# Patient Record
Sex: FEMALE | Race: WHITE | NOT HISPANIC OR LATINO | Employment: UNEMPLOYED | URBAN - METROPOLITAN AREA
[De-identification: names, ages, dates, MRNs, and addresses within clinical notes are randomized per-mention and may not be internally consistent; named-entity substitution may affect disease eponyms.]

---

## 2017-02-13 ENCOUNTER — OFFICE VISIT - HEALTHEAST (OUTPATIENT)
Dept: INTERNAL MEDICINE | Facility: CLINIC | Age: 52
End: 2017-02-13

## 2017-02-13 DIAGNOSIS — E89.0 POSTOPERATIVE HYPOTHYROIDISM: ICD-10-CM

## 2017-02-13 DIAGNOSIS — Z13.9 SCREENING: ICD-10-CM

## 2017-02-13 DIAGNOSIS — R20.2 PARESTHESIAS: ICD-10-CM

## 2017-02-13 DIAGNOSIS — D64.9 ANEMIA: ICD-10-CM

## 2017-02-13 DIAGNOSIS — Z00.00 MEDICARE ANNUAL WELLNESS VISIT, INITIAL: ICD-10-CM

## 2017-02-13 DIAGNOSIS — Z12.31 ENCOUNTER FOR SCREENING MAMMOGRAM FOR MALIGNANT NEOPLASM OF BREAST: ICD-10-CM

## 2017-02-13 DIAGNOSIS — E66.9 OBESITY: ICD-10-CM

## 2017-02-13 DIAGNOSIS — E03.9 HYPOTHYROIDISM: ICD-10-CM

## 2017-02-13 DIAGNOSIS — Z12.39 BREAST CANCER SCREENING: ICD-10-CM

## 2017-02-13 DIAGNOSIS — R53.83 FATIGUE: ICD-10-CM

## 2017-02-13 LAB
CHOLEST SERPL-MCNC: 261 MG/DL
FASTING STATUS PATIENT QL REPORTED: YES
HDLC SERPL-MCNC: 80 MG/DL
LDLC SERPL CALC-MCNC: 161 MG/DL
TRIGL SERPL-MCNC: 102 MG/DL

## 2017-02-13 ASSESSMENT — MIFFLIN-ST. JEOR: SCORE: 1769.31

## 2017-02-14 ENCOUNTER — AMBULATORY - HEALTHEAST (OUTPATIENT)
Dept: INTERNAL MEDICINE | Facility: CLINIC | Age: 52
End: 2017-02-14

## 2017-02-14 ENCOUNTER — COMMUNICATION - HEALTHEAST (OUTPATIENT)
Dept: INTERNAL MEDICINE | Facility: CLINIC | Age: 52
End: 2017-02-14

## 2017-02-15 ENCOUNTER — COMMUNICATION - HEALTHEAST (OUTPATIENT)
Dept: INTERNAL MEDICINE | Facility: CLINIC | Age: 52
End: 2017-02-15

## 2017-02-15 ENCOUNTER — AMBULATORY - HEALTHEAST (OUTPATIENT)
Dept: INTERNAL MEDICINE | Facility: CLINIC | Age: 52
End: 2017-02-15

## 2017-02-15 DIAGNOSIS — M54.12 CERVICAL RADICULOPATHY: ICD-10-CM

## 2017-06-05 ENCOUNTER — OFFICE VISIT - HEALTHEAST (OUTPATIENT)
Dept: INTERNAL MEDICINE | Facility: CLINIC | Age: 52
End: 2017-06-05

## 2017-06-05 ENCOUNTER — AMBULATORY - HEALTHEAST (OUTPATIENT)
Dept: INTERNAL MEDICINE | Facility: CLINIC | Age: 52
End: 2017-06-05

## 2017-06-05 DIAGNOSIS — E03.9 PRIMARY HYPOTHYROIDISM: ICD-10-CM

## 2017-06-05 DIAGNOSIS — K52.9 GASTROENTERITIS: ICD-10-CM

## 2017-06-05 ASSESSMENT — MIFFLIN-ST. JEOR: SCORE: 1692.19

## 2017-08-06 ENCOUNTER — COMMUNICATION - HEALTHEAST (OUTPATIENT)
Dept: INTERNAL MEDICINE | Facility: CLINIC | Age: 52
End: 2017-08-06

## 2017-11-19 ENCOUNTER — COMMUNICATION - HEALTHEAST (OUTPATIENT)
Dept: INTERNAL MEDICINE | Facility: CLINIC | Age: 52
End: 2017-11-19

## 2018-02-05 ENCOUNTER — COMMUNICATION - HEALTHEAST (OUTPATIENT)
Dept: INTERNAL MEDICINE | Facility: CLINIC | Age: 53
End: 2018-02-05

## 2018-02-05 DIAGNOSIS — E03.9 HYPOTHYROID: ICD-10-CM

## 2018-02-11 ENCOUNTER — COMMUNICATION - HEALTHEAST (OUTPATIENT)
Dept: INTERNAL MEDICINE | Facility: CLINIC | Age: 53
End: 2018-02-11

## 2018-02-11 DIAGNOSIS — R12 HEARTBURN: ICD-10-CM

## 2018-05-01 ENCOUNTER — COMMUNICATION - HEALTHEAST (OUTPATIENT)
Dept: INTERNAL MEDICINE | Facility: CLINIC | Age: 53
End: 2018-05-01

## 2018-05-01 DIAGNOSIS — E03.9 HYPOTHYROID: ICD-10-CM

## 2018-08-06 ENCOUNTER — OFFICE VISIT - HEALTHEAST (OUTPATIENT)
Dept: INTERNAL MEDICINE | Facility: CLINIC | Age: 53
End: 2018-08-06

## 2018-08-06 DIAGNOSIS — Z98.84 HISTORY OF GASTRIC BYPASS: ICD-10-CM

## 2018-08-06 DIAGNOSIS — Z12.11 COLON CANCER SCREENING: ICD-10-CM

## 2018-08-06 DIAGNOSIS — Z12.31 VISIT FOR SCREENING MAMMOGRAM: ICD-10-CM

## 2018-08-06 DIAGNOSIS — E03.9 ACQUIRED HYPOTHYROIDISM: ICD-10-CM

## 2018-08-06 LAB
BASOPHILS # BLD AUTO: 0 THOU/UL (ref 0–0.2)
BASOPHILS NFR BLD AUTO: 0 % (ref 0–2)
EOSINOPHIL # BLD AUTO: 0.1 THOU/UL (ref 0–0.4)
EOSINOPHIL NFR BLD AUTO: 2 % (ref 0–6)
ERYTHROCYTE [DISTWIDTH] IN BLOOD BY AUTOMATED COUNT: 16.4 % (ref 11–14.5)
HCT VFR BLD AUTO: 31.7 % (ref 35–47)
HGB BLD-MCNC: 10.2 G/DL (ref 12–16)
LYMPHOCYTES # BLD AUTO: 1.5 THOU/UL (ref 0.8–4.4)
LYMPHOCYTES NFR BLD AUTO: 27 % (ref 20–40)
MCH RBC QN AUTO: 25.7 PG (ref 27–34)
MCHC RBC AUTO-ENTMCNC: 32.3 G/DL (ref 32–36)
MCV RBC AUTO: 80 FL (ref 80–100)
MONOCYTES # BLD AUTO: 0.3 THOU/UL (ref 0–0.9)
MONOCYTES NFR BLD AUTO: 6 % (ref 2–10)
NEUTROPHILS # BLD AUTO: 3.5 THOU/UL (ref 2–7.7)
NEUTROPHILS NFR BLD AUTO: 65 % (ref 50–70)
PLATELET # BLD AUTO: 212 THOU/UL (ref 140–440)
PMV BLD AUTO: 7.9 FL (ref 7–10)
RBC # BLD AUTO: 3.99 MILL/UL (ref 3.8–5.4)
WBC: 5.5 THOU/UL (ref 4–11)

## 2018-08-06 ASSESSMENT — MIFFLIN-ST. JEOR: SCORE: 1778.38

## 2018-08-10 LAB — TSI ACT/NOR SER: 106 %

## 2018-08-13 ENCOUNTER — COMMUNICATION - HEALTHEAST (OUTPATIENT)
Dept: INTERNAL MEDICINE | Facility: CLINIC | Age: 53
End: 2018-08-13

## 2018-08-13 DIAGNOSIS — E03.9 HYPOTHYROID: ICD-10-CM

## 2018-08-14 ENCOUNTER — COMMUNICATION - HEALTHEAST (OUTPATIENT)
Dept: INTERNAL MEDICINE | Facility: CLINIC | Age: 53
End: 2018-08-14

## 2018-08-20 ENCOUNTER — COMMUNICATION - HEALTHEAST (OUTPATIENT)
Dept: INTERNAL MEDICINE | Facility: CLINIC | Age: 53
End: 2018-08-20

## 2018-09-04 ENCOUNTER — COMMUNICATION - HEALTHEAST (OUTPATIENT)
Dept: INTERNAL MEDICINE | Facility: CLINIC | Age: 53
End: 2018-09-04

## 2018-10-02 ENCOUNTER — COMMUNICATION - HEALTHEAST (OUTPATIENT)
Dept: INTERNAL MEDICINE | Facility: CLINIC | Age: 53
End: 2018-10-02

## 2018-10-02 DIAGNOSIS — R12 HEARTBURN: ICD-10-CM

## 2018-11-20 ENCOUNTER — OFFICE VISIT - HEALTHEAST (OUTPATIENT)
Dept: INTERNAL MEDICINE | Facility: CLINIC | Age: 53
End: 2018-11-20

## 2018-11-20 DIAGNOSIS — Z23 NEED FOR VACCINATION: ICD-10-CM

## 2018-11-20 DIAGNOSIS — D50.9 IRON DEFICIENCY ANEMIA, UNSPECIFIED IRON DEFICIENCY ANEMIA TYPE: ICD-10-CM

## 2018-11-20 LAB
BASOPHILS # BLD AUTO: 0 THOU/UL (ref 0–0.2)
BASOPHILS NFR BLD AUTO: 0 % (ref 0–2)
EOSINOPHIL # BLD AUTO: 0.1 THOU/UL (ref 0–0.4)
EOSINOPHIL NFR BLD AUTO: 2 % (ref 0–6)
ERYTHROCYTE [DISTWIDTH] IN BLOOD BY AUTOMATED COUNT: 15.2 % (ref 11–14.5)
FERRITIN SERPL-MCNC: 6 NG/ML (ref 10–130)
HCT VFR BLD AUTO: 31.7 % (ref 35–47)
HGB BLD-MCNC: 10.2 G/DL (ref 12–16)
LYMPHOCYTES # BLD AUTO: 1.4 THOU/UL (ref 0.8–4.4)
LYMPHOCYTES NFR BLD AUTO: 29 % (ref 20–40)
MCH RBC QN AUTO: 25.8 PG (ref 27–34)
MCHC RBC AUTO-ENTMCNC: 32.1 G/DL (ref 32–36)
MCV RBC AUTO: 80 FL (ref 80–100)
MONOCYTES # BLD AUTO: 0.3 THOU/UL (ref 0–0.9)
MONOCYTES NFR BLD AUTO: 6 % (ref 2–10)
NEUTROPHILS # BLD AUTO: 2.9 THOU/UL (ref 2–7.7)
NEUTROPHILS NFR BLD AUTO: 63 % (ref 50–70)
PLATELET # BLD AUTO: 201 THOU/UL (ref 140–440)
PMV BLD AUTO: 7.8 FL (ref 7–10)
RBC # BLD AUTO: 3.94 MILL/UL (ref 3.8–5.4)
WBC: 4.7 THOU/UL (ref 4–11)

## 2018-11-21 ENCOUNTER — COMMUNICATION - HEALTHEAST (OUTPATIENT)
Dept: INTERNAL MEDICINE | Facility: CLINIC | Age: 53
End: 2018-11-21

## 2018-11-27 ENCOUNTER — COMMUNICATION - HEALTHEAST (OUTPATIENT)
Dept: INTERNAL MEDICINE | Facility: CLINIC | Age: 53
End: 2018-11-27

## 2018-11-27 DIAGNOSIS — E03.9 HYPOTHYROID: ICD-10-CM

## 2019-05-30 ENCOUNTER — COMMUNICATION - HEALTHEAST (OUTPATIENT)
Dept: INTERNAL MEDICINE | Facility: CLINIC | Age: 54
End: 2019-05-30

## 2019-05-30 DIAGNOSIS — E53.8 LOW SERUM VITAMIN B12: ICD-10-CM

## 2019-06-25 ENCOUNTER — COMMUNICATION - HEALTHEAST (OUTPATIENT)
Dept: INTERNAL MEDICINE | Facility: CLINIC | Age: 54
End: 2019-06-25

## 2019-10-03 ENCOUNTER — RECORDS - HEALTHEAST (OUTPATIENT)
Dept: ADMINISTRATIVE | Facility: OTHER | Age: 54
End: 2019-10-03

## 2019-10-05 ENCOUNTER — RECORDS - HEALTHEAST (OUTPATIENT)
Dept: ADMINISTRATIVE | Facility: OTHER | Age: 54
End: 2019-10-05

## 2019-10-09 ENCOUNTER — OFFICE VISIT - HEALTHEAST (OUTPATIENT)
Dept: INTERNAL MEDICINE | Facility: CLINIC | Age: 54
End: 2019-10-09

## 2019-10-09 DIAGNOSIS — Z12.31 VISIT FOR SCREENING MAMMOGRAM: ICD-10-CM

## 2019-10-09 DIAGNOSIS — E53.8 VITAMIN B12 DEFICIENCY (NON ANEMIC): ICD-10-CM

## 2019-10-09 DIAGNOSIS — E53.8 LOW SERUM VITAMIN B12: ICD-10-CM

## 2019-10-09 DIAGNOSIS — Z48.02 VISIT FOR SUTURE REMOVAL: ICD-10-CM

## 2019-10-09 ASSESSMENT — MIFFLIN-ST. JEOR: SCORE: 1769.31

## 2019-11-06 ENCOUNTER — COMMUNICATION - HEALTHEAST (OUTPATIENT)
Dept: INTERNAL MEDICINE | Facility: CLINIC | Age: 54
End: 2019-11-06

## 2019-11-06 DIAGNOSIS — R12 HEARTBURN: ICD-10-CM

## 2019-11-30 ENCOUNTER — COMMUNICATION - HEALTHEAST (OUTPATIENT)
Dept: INTERNAL MEDICINE | Facility: CLINIC | Age: 54
End: 2019-11-30

## 2019-11-30 DIAGNOSIS — E03.9 HYPOTHYROID: ICD-10-CM

## 2019-12-03 ENCOUNTER — COMMUNICATION - HEALTHEAST (OUTPATIENT)
Dept: INTERNAL MEDICINE | Facility: CLINIC | Age: 54
End: 2019-12-03

## 2019-12-03 DIAGNOSIS — E03.9 HYPOTHYROID: ICD-10-CM

## 2019-12-04 ENCOUNTER — OFFICE VISIT - HEALTHEAST (OUTPATIENT)
Dept: INTERNAL MEDICINE | Facility: CLINIC | Age: 54
End: 2019-12-04

## 2019-12-04 DIAGNOSIS — Z23 NEED FOR VACCINATION: ICD-10-CM

## 2019-12-04 DIAGNOSIS — E03.9 ACQUIRED HYPOTHYROIDISM: ICD-10-CM

## 2019-12-04 DIAGNOSIS — D50.8 IRON DEFICIENCY ANEMIA SECONDARY TO INADEQUATE DIETARY IRON INTAKE: ICD-10-CM

## 2019-12-04 LAB
ERYTHROCYTE [DISTWIDTH] IN BLOOD BY AUTOMATED COUNT: 16.7 % (ref 11–14.5)
FERRITIN SERPL-MCNC: 7 NG/ML (ref 10–130)
HCT VFR BLD AUTO: 30.9 % (ref 35–47)
HGB BLD-MCNC: 9.7 G/DL (ref 12–16)
MCH RBC QN AUTO: 23.8 PG (ref 27–34)
MCHC RBC AUTO-ENTMCNC: 31.3 G/DL (ref 32–36)
MCV RBC AUTO: 76 FL (ref 80–100)
PLATELET # BLD AUTO: 231 THOU/UL (ref 140–440)
PMV BLD AUTO: 7.9 FL (ref 7–10)
RBC # BLD AUTO: 4.07 MILL/UL (ref 3.8–5.4)
TSH SERPL DL<=0.005 MIU/L-ACNC: 3.83 UIU/ML (ref 0.3–5)
WBC: 6.9 THOU/UL (ref 4–11)

## 2019-12-04 ASSESSMENT — MIFFLIN-ST. JEOR: SCORE: 1769.31

## 2019-12-05 ENCOUNTER — COMMUNICATION - HEALTHEAST (OUTPATIENT)
Dept: INTERNAL MEDICINE | Facility: CLINIC | Age: 54
End: 2019-12-05

## 2020-02-29 ENCOUNTER — COMMUNICATION - HEALTHEAST (OUTPATIENT)
Dept: INTERNAL MEDICINE | Facility: CLINIC | Age: 55
End: 2020-02-29

## 2020-02-29 DIAGNOSIS — E03.9 HYPOTHYROID: ICD-10-CM

## 2020-10-26 ENCOUNTER — OFFICE VISIT - HEALTHEAST (OUTPATIENT)
Dept: INTERNAL MEDICINE | Facility: CLINIC | Age: 55
End: 2020-10-26

## 2020-10-26 DIAGNOSIS — Z98.84 HISTORY OF GASTRIC BYPASS: ICD-10-CM

## 2020-10-26 DIAGNOSIS — F19.10 SUBSTANCE ABUSE (H): ICD-10-CM

## 2020-10-26 DIAGNOSIS — F60.3 BORDERLINE PERSONALITY DISORDER (H): ICD-10-CM

## 2020-10-26 DIAGNOSIS — E53.8 LOW SERUM VITAMIN B12: ICD-10-CM

## 2020-10-26 DIAGNOSIS — E66.01 MORBID OBESITY (H): ICD-10-CM

## 2020-10-26 DIAGNOSIS — E03.9 ACQUIRED HYPOTHYROIDISM: ICD-10-CM

## 2020-10-26 DIAGNOSIS — Z86.2 HISTORY OF IRON DEFICIENCY ANEMIA: ICD-10-CM

## 2020-10-26 DIAGNOSIS — Z23 NEED FOR VACCINATION: ICD-10-CM

## 2020-10-26 DIAGNOSIS — Z86.59 HISTORY OF DEPRESSION: ICD-10-CM

## 2020-10-26 DIAGNOSIS — E53.8 VITAMIN B12 DEFICIENCY (NON ANEMIC): ICD-10-CM

## 2020-10-26 DIAGNOSIS — Z79.899 MEDICATION MANAGEMENT: ICD-10-CM

## 2020-10-26 LAB
ALBUMIN SERPL-MCNC: 4.2 G/DL (ref 3.5–5)
ALP SERPL-CCNC: 104 U/L (ref 45–120)
ALT SERPL W P-5'-P-CCNC: 59 U/L (ref 0–45)
ANION GAP SERPL CALCULATED.3IONS-SCNC: 8 MMOL/L (ref 5–18)
AST SERPL W P-5'-P-CCNC: 38 U/L (ref 0–40)
BILIRUB SERPL-MCNC: 0.4 MG/DL (ref 0–1)
BUN SERPL-MCNC: 15 MG/DL (ref 8–22)
CALCIUM SERPL-MCNC: 9.7 MG/DL (ref 8.5–10.5)
CHLORIDE BLD-SCNC: 109 MMOL/L (ref 98–107)
CO2 SERPL-SCNC: 22 MMOL/L (ref 22–31)
CREAT SERPL-MCNC: 0.97 MG/DL (ref 0.6–1.1)
ERYTHROCYTE [DISTWIDTH] IN BLOOD BY AUTOMATED COUNT: 17.2 % (ref 11–14.5)
GFR SERPL CREATININE-BSD FRML MDRD: 59 ML/MIN/1.73M2
GLUCOSE BLD-MCNC: 95 MG/DL (ref 70–125)
HCT VFR BLD AUTO: 33.7 % (ref 35–47)
HGB BLD-MCNC: 10.6 G/DL (ref 12–16)
MCH RBC QN AUTO: 24.8 PG (ref 27–34)
MCHC RBC AUTO-ENTMCNC: 31.3 G/DL (ref 32–36)
MCV RBC AUTO: 79 FL (ref 80–100)
PLATELET # BLD AUTO: 248 THOU/UL (ref 140–440)
PMV BLD AUTO: 8.3 FL (ref 7–10)
POTASSIUM BLD-SCNC: 4.4 MMOL/L (ref 3.5–5)
PROT SERPL-MCNC: 6.5 G/DL (ref 6–8)
RBC # BLD AUTO: 4.26 MILL/UL (ref 3.8–5.4)
SODIUM SERPL-SCNC: 139 MMOL/L (ref 136–145)
TSH SERPL DL<=0.005 MIU/L-ACNC: 3.37 UIU/ML (ref 0.3–5)
VIT B12 SERPL-MCNC: 570 PG/ML (ref 213–816)
WBC: 5.7 THOU/UL (ref 4–11)

## 2020-10-26 ASSESSMENT — MIFFLIN-ST. JEOR: SCORE: 1723.95

## 2020-10-27 ENCOUNTER — COMMUNICATION - HEALTHEAST (OUTPATIENT)
Dept: INTERNAL MEDICINE | Facility: CLINIC | Age: 55
End: 2020-10-27

## 2021-02-12 ENCOUNTER — COMMUNICATION - HEALTHEAST (OUTPATIENT)
Dept: ADMINISTRATIVE | Facility: CLINIC | Age: 56
End: 2021-02-12

## 2021-02-12 ENCOUNTER — COMMUNICATION - HEALTHEAST (OUTPATIENT)
Dept: INTERNAL MEDICINE | Facility: CLINIC | Age: 56
End: 2021-02-12

## 2021-02-12 DIAGNOSIS — R12 HEARTBURN: ICD-10-CM

## 2021-02-12 DIAGNOSIS — E03.9 HYPOTHYROID: ICD-10-CM

## 2021-02-24 ENCOUNTER — COMMUNICATION - HEALTHEAST (OUTPATIENT)
Dept: INTERNAL MEDICINE | Facility: CLINIC | Age: 56
End: 2021-02-24

## 2021-02-24 ENCOUNTER — OFFICE VISIT - HEALTHEAST (OUTPATIENT)
Dept: FAMILY MEDICINE | Facility: CLINIC | Age: 56
End: 2021-02-24

## 2021-02-24 DIAGNOSIS — E03.9 HYPOTHYROID: ICD-10-CM

## 2021-02-24 DIAGNOSIS — D64.9 ANEMIA, UNSPECIFIED TYPE: ICD-10-CM

## 2021-02-24 DIAGNOSIS — Z76.89 ENCOUNTER TO ESTABLISH CARE: ICD-10-CM

## 2021-02-24 DIAGNOSIS — Z98.84 HISTORY OF GASTRIC BYPASS: ICD-10-CM

## 2021-02-24 DIAGNOSIS — Z12.31 VISIT FOR SCREENING MAMMOGRAM: ICD-10-CM

## 2021-02-24 LAB
BASOPHILS # BLD AUTO: 0 THOU/UL (ref 0–0.2)
BASOPHILS NFR BLD AUTO: 1 % (ref 0–2)
EOSINOPHIL # BLD AUTO: 0 THOU/UL (ref 0–0.4)
EOSINOPHIL NFR BLD AUTO: 0 % (ref 0–6)
ERYTHROCYTE [DISTWIDTH] IN BLOOD BY AUTOMATED COUNT: 16.5 % (ref 11–14.5)
HCT VFR BLD AUTO: 33.1 % (ref 35–47)
HGB BLD-MCNC: 9.9 G/DL (ref 12–16)
IMM GRANULOCYTES # BLD: 0 THOU/UL
IMM GRANULOCYTES NFR BLD: 0 %
LYMPHOCYTES # BLD AUTO: 1.2 THOU/UL (ref 0.8–4.4)
LYMPHOCYTES NFR BLD AUTO: 23 % (ref 20–40)
MCH RBC QN AUTO: 24.3 PG (ref 27–34)
MCHC RBC AUTO-ENTMCNC: 29.9 G/DL (ref 32–36)
MCV RBC AUTO: 81 FL (ref 80–100)
MONOCYTES # BLD AUTO: 0.5 THOU/UL (ref 0–0.9)
MONOCYTES NFR BLD AUTO: 9 % (ref 2–10)
NEUTROPHILS # BLD AUTO: 3.4 THOU/UL (ref 2–7.7)
NEUTROPHILS NFR BLD AUTO: 67 % (ref 50–70)
PLATELET # BLD AUTO: 219 THOU/UL (ref 140–440)
PMV BLD AUTO: 10.4 FL (ref 7–10)
RBC # BLD AUTO: 4.08 MILL/UL (ref 3.8–5.4)
VIT B12 SERPL-MCNC: 670 PG/ML (ref 213–816)
WBC: 5.1 THOU/UL (ref 4–11)

## 2021-02-24 ASSESSMENT — MIFFLIN-ST. JEOR: SCORE: 1729.05

## 2021-02-26 ENCOUNTER — COMMUNICATION - HEALTHEAST (OUTPATIENT)
Dept: FAMILY MEDICINE | Facility: CLINIC | Age: 56
End: 2021-02-26

## 2021-05-03 ENCOUNTER — COMMUNICATION - HEALTHEAST (OUTPATIENT)
Dept: INTERNAL MEDICINE | Facility: CLINIC | Age: 56
End: 2021-05-03

## 2021-05-03 DIAGNOSIS — R12 HEARTBURN: ICD-10-CM

## 2021-05-03 DIAGNOSIS — E03.9 HYPOTHYROID: ICD-10-CM

## 2021-05-29 NOTE — TELEPHONE ENCOUNTER
"RN cannot approve Refill Request    RN can NOT refill this medication PCP messaged that patient is overdue for Labs.      Rosario Lindquist, [facility] Triage/Med Refill 5/31/2019    Requested Prescriptions   Pending Prescriptions Disp Refills     BD INTEGRA SYRINGE 3 mL 25 gauge x 1\" Syrg [Pharmacy Med Name: B-D #5270 SYR/NDL 3ML 25GX1 INTEGRA]  0     Sig: USE AS DIRECTED       There is no refill protocol information for this order        cyanocobalamin 1,000 mcg/mL injection [Pharmacy Med Name: CYANOCOBALAMIN 1000MCG/ML INJ, 1ML] 10 mL 0     Sig: INJECT 1 ML INTRAMUSCULARLY EVERY 30 DAYS       Cyanocobalamin (Vitamin B12)  Refill Protocol Failed - 5/30/2019  9:21 PM        Failed - Vitamin B12 level in last 12 months     Vitamin B-12   Date Value Ref Range Status   02/13/2017 562 213 - 816 pg/mL Final             Passed - PCP or prescribing provider visit in past 12 months       Last office visit with prescriber/PCP: 11/20/2018 Niles Jaramillo MD OR same dept: 11/20/2018 Niles Jaramillo MD OR same specialty: 11/20/2018 Niles Jaramillo MD Last physical: 2/13/2017 Last MTM visit: Visit date not found    Next visit within 3 mo: Visit date not found  Next physical within 3 mo: Visit date not found  Prescriber OR PCP: Niles Jaramillo MD  Last diagnosis associated with med order: There are no diagnoses linked to this encounter.             Passed - CBC in last 12 months     WBC   Date Value Ref Range Status   11/20/2018 4.7 4.0 - 11.0 thou/uL Final     RBC   Date Value Ref Range Status   11/20/2018 3.94 3.80 - 5.40 mill/uL Final     Hemoglobin   Date Value Ref Range Status   11/20/2018 10.2 (L) 12.0 - 16.0 g/dL Final     Hematocrit   Date Value Ref Range Status   11/20/2018 31.7 (L) 35.0 - 47.0 % Final     MCV   Date Value Ref Range Status   11/20/2018 80 80 - 100 fL Final     MCH   Date Value Ref Range Status   11/20/2018 25.8 (L) 27.0 - 34.0 pg Final     MCHC   Date Value Ref Range Status   11/20/2018 32.1 " 32.0 - 36.0 g/dL Final     RDW   Date Value Ref Range Status   11/20/2018 15.2 (H) 11.0 - 14.5 % Final     Platelets   Date Value Ref Range Status   11/20/2018 201 140 - 440 thou/uL Final     MPV   Date Value Ref Range Status   11/20/2018 7.8 7.0 - 10.0 fL Final

## 2021-05-30 VITALS — WEIGHT: 269 LBS | BODY MASS INDEX: 47.66 KG/M2 | HEIGHT: 63 IN

## 2021-05-31 ENCOUNTER — RECORDS - HEALTHEAST (OUTPATIENT)
Dept: ADMINISTRATIVE | Facility: CLINIC | Age: 56
End: 2021-05-31

## 2021-05-31 VITALS — WEIGHT: 252 LBS | BODY MASS INDEX: 44.65 KG/M2 | HEIGHT: 63 IN

## 2021-06-01 ENCOUNTER — RECORDS - HEALTHEAST (OUTPATIENT)
Dept: ADMINISTRATIVE | Facility: CLINIC | Age: 56
End: 2021-06-01

## 2021-06-01 VITALS — BODY MASS INDEX: 48.02 KG/M2 | WEIGHT: 271 LBS | HEIGHT: 63 IN

## 2021-06-02 VITALS — BODY MASS INDEX: 48.01 KG/M2 | HEIGHT: 63 IN

## 2021-06-02 NOTE — PROGRESS NOTES
"OFFICE VISIT NOTE    Subjective:   Chief Complaint:  Follow-up (ER, laceration on left forehead, had stitches placed that need to be removed. still having nausea, HA, and dizziness)    54-year-old woman who is in today for follow-up regarding head trauma.  About a week ago, shelf with a microwave collapsed and hit her arm forehead, left side.  She required emergency room visit and placement of sutures.  Since the injury she is experiencing some nausea and some mild dizziness.  Headaches as well.  No neurologic deficits.    Current Outpatient Medications   Medication Sig     BD INTEGRA SYRINGE 3 mL 25 gauge x 1\" Syrg USE AS DIRECTED     cyanocobalamin 1,000 mcg/mL injection INJECT 1 ML INTRAMUSCULARLY EVERY 30 DAYS     levothyroxine (SYNTHROID, LEVOTHROID) 175 MCG tablet TAKE 1 TABLET(175 MCG) BY MOUTH DAILY     meclizine (ANTIVERT) 25 mg tablet TK 1 T PO Q 6 H PRN FOR VERTIGO     omeprazole (PRILOSEC) 20 MG capsule TAKE 1 CAPSULE BY MOUTH TWICE DAILY     syringe with needle (SYRINGE 3CC/25GX1\") 3 mL 25 gauge x 1\" Syrg Use 1 each As Directed every 30 (thirty) days.     venlafaxine (EFFEXOR) 75 MG tablet Take 150 mg by mouth 2 (two) times a day.     cyanocobalamin 1,000 mcg/mL injection Inject 1 mL (1,000 mcg total) into the shoulder, thigh, or buttocks every 28 days.       Review of Systems:  A comprehensive review of systems is negative except for the comments above    Objective:    Pulse 77   Ht 5' 3\" (1.6 m)   Wt (!) 269 lb (122 kg)   SpO2 98%   BMI 47.65 kg/m    GENERAL: No acute distress.  Blood pressure is 136/84.  Pulse 68 and regular.  Eyes have full extraocular motions.  Half inch long laceration on the left side of the forehead.  Some surrounding ecchymosis.  No evidence of any cellulitis.  Speech and memory are intact.  Balance is normal.  Motor function is normal.  Eyes are normal without any papilledema or hemorrhaging.    Assessment & Plan   Makenna Black is a 54 y.o. female.    Head trauma with " laceration.  All sutures were removed without difficulty.  Post trauma headache with some dizziness and nausea.  PRN use of Tylenol.  I think this will slowly resolve.    Diagnoses and all orders for this visit:    Visit for screening mammogram  -     Mammo Screening Bilateral; Future; Expected date: 10/09/2019    Visit for suture removal    Vitamin B12 deficiency (non anemic)  -     cyanocobalamin 1,000 mcg/mL injection; Inject 1 mL (1,000 mcg total) into the shoulder, thigh, or buttocks every 28 days.  Dispense: 10 mL; Refill: 0    Low serum vitamin B12    Other orders  -     Influenza, Seasonal Quad, PF =/> 6months        Niles Jaramillo MD  Transcription using voice recognition software, may contain typographical errors.

## 2021-06-03 VITALS
WEIGHT: 269 LBS | DIASTOLIC BLOOD PRESSURE: 84 MMHG | OXYGEN SATURATION: 98 % | SYSTOLIC BLOOD PRESSURE: 136 MMHG | HEIGHT: 63 IN | BODY MASS INDEX: 47.66 KG/M2 | HEART RATE: 77 BPM

## 2021-06-03 NOTE — TELEPHONE ENCOUNTER
RN cannot approve Refill Request    RN can NOT refill this medication ---> PCP messaged that patient is overdue for Labs (TSH).  Last office visit:  10/9/2019     Luz Welch, [facility] Triage/Med Refill 12/3/2019    Requested Prescriptions   Pending Prescriptions Disp Refills     levothyroxine (SYNTHROID, LEVOTHROID) 175 MCG tablet [Pharmacy Med Name: LEVOTHYROXINE 0.175MG (175MCG) TABS] 90 tablet 0     Sig: TAKE 1 TABLET(175 MCG) BY MOUTH DAILY       Thyroid Hormones Protocol Failed - 12/3/2019 11:02 AM        Failed - TSH on file in past 12 months for patient age 12 & older     TSH   Date Value Ref Range Status   06/01/2017 23.79 (H) 0.30 - 5.00 uIU/mL Final                   Passed - Provider visit in past 12 months or next 3 months     Last office visit with prescriber/PCP: 10/9/2019 Niles Jaramillo MD OR same dept: 10/9/2019 Niles Jaramillo MD OR same specialty: 10/9/2019 Niles Jaramillo MD  Last physical: 2/13/2017 Last MTM visit: Visit date not found   Next visit within 3 mo: Visit date not found  Next physical within 3 mo: Visit date not found  Prescriber OR PCP: Niles Jaramillo MD  Last diagnosis associated with med order: 1. Hypothyroid  - levothyroxine (SYNTHROID, LEVOTHROID) 175 MCG tablet [Pharmacy Med Name: LEVOTHYROXINE 0.175MG (175MCG) TABS]; TAKE 1 TABLET(175 MCG) BY MOUTH DAILY  Dispense: 90 tablet; Refill: 0    If protocol passes may refill for 12 months if within 3 months of last provider visit (or a total of 15 months).

## 2021-06-03 NOTE — TELEPHONE ENCOUNTER
RN cannot approve Refill Request    RN can NOT refill this medication Protocol failed and NO refill given. Lab needed.      Ban Gilmore, [facility] Triage/Med Refill 11/30/2019    Requested Prescriptions   Pending Prescriptions Disp Refills     levothyroxine (SYNTHROID, LEVOTHROID) 175 MCG tablet [Pharmacy Med Name: LEVOTHYROXINE 0.175MG (175MCG) TABS] 90 tablet 0     Sig: TAKE 1 TABLET(175 MCG) BY MOUTH DAILY       Thyroid Hormones Protocol Failed - 11/30/2019 10:03 AM        Failed - TSH on file in past 12 months for patient age 12 & older     TSH   Date Value Ref Range Status   06/01/2017 23.79 (H) 0.30 - 5.00 uIU/mL Final                   Passed - Provider visit in past 12 months or next 3 months     Last office visit with prescriber/PCP: 10/9/2019 Niles Jaramillo MD OR same dept: 10/9/2019 Niles Jaramillo MD OR same specialty: 10/9/2019 Niles Jaramillo MD  Last physical: 2/13/2017 Last MTM visit: Visit date not found   Next visit within 3 mo: Visit date not found  Next physical within 3 mo: Visit date not found  Prescriber OR PCP: Niles Jaramillo MD  Last diagnosis associated with med order: 1. Hypothyroid  - levothyroxine (SYNTHROID, LEVOTHROID) 175 MCG tablet [Pharmacy Med Name: LEVOTHYROXINE 0.175MG (175MCG) TABS]; TAKE 1 TABLET(175 MCG) BY MOUTH DAILY  Dispense: 90 tablet; Refill: 0    If protocol passes may refill for 12 months if within 3 months of last provider visit (or a total of 15 months).

## 2021-06-03 NOTE — PROGRESS NOTES
"OFFICE VISIT NOTE    Subjective:   Chief Complaint:  Follow-up    54-year-old woman in for follow-up regarding history of hypothyroidism.  Also past history of gastric bypass and developed iron deficiency anemia because of poor iron absorption.  She is doing okay.  Some fatigue.  Needs her thyroid checked.    Current Outpatient Medications   Medication Sig     BD INTEGRA SYRINGE 3 mL 25 gauge x 1\" Syrg USE AS DIRECTED     cyanocobalamin 1,000 mcg/mL injection Inject 1 mL (1,000 mcg total) into the shoulder, thigh, or buttocks every 28 days.     levothyroxine (SYNTHROID, LEVOTHROID) 175 MCG tablet TAKE 1 TABLET(175 MCG) BY MOUTH DAILY     meclizine (ANTIVERT) 25 mg tablet TK 1 T PO Q 6 H PRN FOR VERTIGO     omeprazole (PRILOSEC) 20 MG capsule TAKE 1 CAPSULE BY MOUTH TWICE DAILY     syringe with needle (SYRINGE 3CC/25GX1\") 3 mL 25 gauge x 1\" Syrg Use 1 each As Directed every 30 (thirty) days.     venlafaxine (EFFEXOR) 75 MG tablet Take 150 mg by mouth 2 (two) times a day.       Review of Systems:  A comprehensive review of systems is negative except for the comments above    Objective:    There were no vitals taken for this visit.  GENERAL: No acute distress.  She is significantly obese yet.  Blood pressures 136/84.  Pulse 76.  Thyroid is been surgically removed.  Reflexes are normal.  Heart shows a sinus rhythm without murmur gallop or rub.  Lungs do sound clear.  She appears a little pale but not profoundly so.  Some osteoarthritic changes of her joints.  Assessment & Plan   Makenna Black is a 54 y.o. female.    Check TSH.  Adjust thyroid as necessary.  Recheck her hemoglobin as she has had iron deficiency in the past.  In fact, she required iron infusions several years ago.  She also needs a tetanus diphtheria shot.  Diagnoses and all orders for this visit:    Acquired hypothyroidism  -     Thyroid Cascade    Iron deficiency anemia secondary to inadequate dietary iron intake  -     HM2(CBC w/o Differential)    Need " for vaccination  -     Td, Adult, Adsorbed (blue label)        Niles Jaramillo MD  Transcription using voice recognition software, may contain typographical errors.

## 2021-06-03 NOTE — TELEPHONE ENCOUNTER
Refill Approved    Rx renewed per Medication Renewal Policy. Medication was last renewed on 10.9.19.    Lalita Foley, [facility] Triage/Med Refill 11/7/2019     Requested Prescriptions   Pending Prescriptions Disp Refills     omeprazole (PRILOSEC) 20 MG capsule [Pharmacy Med Name: OMEPRAZOLE 20MG CAPSULES] 180 capsule 0     Sig: TAKE 1 CAPSULE BY MOUTH TWICE DAILY       GI Medications Refill Protocol Passed - 11/6/2019  6:36 PM        Passed - PCP or prescribing provider visit in last 12 or next 3 months.     Last office visit with prescriber/PCP: 10/9/2019 Niles Jaramillo MD OR same dept: 10/9/2019 Niles Jaramillo MD OR same specialty: 10/9/2019 Niles Jaramillo MD  Last physical: 2/13/2017 Last MTM visit: Visit date not found   Next visit within 3 mo: Visit date not found  Next physical within 3 mo: Visit date not found  Prescriber OR PCP: Niles Jaramillo MD  Last diagnosis associated with med order: 1. Heartburn  - omeprazole (PRILOSEC) 20 MG capsule [Pharmacy Med Name: OMEPRAZOLE 20MG CAPSULES]; TAKE 1 CAPSULE BY MOUTH TWICE DAILY  Dispense: 180 capsule; Refill: 0    If protocol passes may refill for 12 months if within 3 months of last provider visit (or a total of 15 months).

## 2021-06-03 NOTE — TELEPHONE ENCOUNTER
FYI - Status Update  Who is Calling: Patient  Update: Patient's refill was denied on 11/30/19. Patient stated she is out now. Please send this in as soon as possible. Patient was transferred to scheduling to make a lab appointment.   Okay to leave a detailed message?:  No

## 2021-06-04 VITALS
BODY MASS INDEX: 47.66 KG/M2 | DIASTOLIC BLOOD PRESSURE: 84 MMHG | SYSTOLIC BLOOD PRESSURE: 136 MMHG | HEIGHT: 63 IN | HEART RATE: 76 BPM | WEIGHT: 269 LBS

## 2021-06-05 VITALS
HEART RATE: 82 BPM | TEMPERATURE: 97.9 F | DIASTOLIC BLOOD PRESSURE: 88 MMHG | BODY MASS INDEX: 46.25 KG/M2 | SYSTOLIC BLOOD PRESSURE: 130 MMHG | WEIGHT: 261 LBS | HEIGHT: 63 IN | OXYGEN SATURATION: 97 %

## 2021-06-05 VITALS
SYSTOLIC BLOOD PRESSURE: 138 MMHG | WEIGHT: 259 LBS | OXYGEN SATURATION: 99 % | HEART RATE: 79 BPM | BODY MASS INDEX: 45.89 KG/M2 | HEIGHT: 63 IN | DIASTOLIC BLOOD PRESSURE: 84 MMHG

## 2021-06-06 NOTE — TELEPHONE ENCOUNTER
Refill Approved    Rx renewed per Medication Renewal Policy. Medication was last renewed on 12/13/2019.    Marcial Salcido, [facility] Triage/Med Refill 3/1/2020     Requested Prescriptions   Pending Prescriptions Disp Refills     levothyroxine (SYNTHROID, LEVOTHROID) 175 MCG tablet [Pharmacy Med Name: LEVOTHYROXINE 0.175MG (175MCG) TABS] 90 tablet 0     Sig: TAKE 1 TABLET(175 MCG) BY MOUTH DAILY       Thyroid Hormones Protocol Passed - 2/29/2020 12:39 PM        Passed - Provider visit in past 12 months or next 3 months     Last office visit with prescriber/PCP: 12/4/2019 Niles Jaramillo MD OR same dept: 12/4/2019 Niles Jaramillo MD OR same specialty: 12/4/2019 Niles Jaramillo MD  Last physical: 2/13/2017 Last MTM visit: Visit date not found   Next visit within 3 mo: Visit date not found  Next physical within 3 mo: Visit date not found  Prescriber OR PCP: Niles Jaramillo MD  Last diagnosis associated with med order: 1. Hypothyroid  - levothyroxine (SYNTHROID, LEVOTHROID) 175 MCG tablet [Pharmacy Med Name: LEVOTHYROXINE 0.175MG (175MCG) TABS]; TAKE 1 TABLET(175 MCG) BY MOUTH DAILY  Dispense: 90 tablet; Refill: 0    If protocol passes may refill for 12 months if within 3 months of last provider visit (or a total of 15 months).             Passed - TSH on file in past 12 months for patient age 12 & older     TSH   Date Value Ref Range Status   12/04/2019 3.83 0.30 - 5.00 uIU/mL Final

## 2021-06-08 NOTE — PROGRESS NOTES
Medicare Annual Preventive Physical Exam (Wellness Assessment)   Makenna Black   52 y.o.  female in for annual wellness exam.  She has a history of depression, obesity, postoperative hypothyroidism.  Physical she had thyroidectomy because of large nodules.  One nodule was found to be cancerous was a small 1.  She has been on thyroid replacement since.    Complains of fatigability.  Also has burning paresthesias in her hands at night.  No weakness.      Date of visit: 2/13/2017  Physician: Niles Jaramillo MD     Assessment and Plan   1. Screening  She is due for a mammogram    2. Medicare annual wellness visit, initial      3. Paresthesias  This could be carpal tunnel.  We'll also check B12 and TSH and hemoglobin.  Blood tests are normal, consider EMG    4. Postoperative hypothyroidism  Check a TSH.    5. Obesity  She's had bariatric surgery about 13 years ago was gained all her weight back.  - Lipid Cascade    6. Fatigue  Much more prominent in the past 3 months.  She wonders if she is iron deficient or B12 deficient.  - HM1(CBC and Differential)  - Comprehensive Metabolic Panel  - Urinalysis-UC if Indicated  - HM1 (CBC with Diff)    7. Breast cancer screening   mammogram    8. Anemia    - Vitamin B12  - Ferritin    9. Hypothyroidism  She takes her thyroid daily.  - Thyroid Stimulating Hormone (TSH)      Advanced Care Planning discussed today: yes  Five Wishes Completed and Scanned into EMR: No    No Follow-up on file.     Chief Complaint   Chief Complaint   Patient presents with     Medicare Annual Wellness Visit Initial     Pt is fasting        Patient Profile   Social History     Social History Narrative    Single, no children 12/15        Past Medical History   There is no problem list on file for this patient.      Past Surgical History  She has a past surgical history that includes Cholecystectomy; Appendectomy; Hysterectomy; Salpingoophorectomy; Knee arthroscopy; Sinus surgery; Tonsillectomy; Gastroplasty  vertical banded (2003); Total knee arthroplasty (Bilateral, 2006/2007); Gastric bypass (2007); and Spine surgery.     History of Present Illness   This 52 y.o. old female seen for physical exam.  She complains of fatigability.  Also some burning paresthesias of her hands at night.  No chest pain no shortness of breath no fevers or chills.  She continues to gain weight.  She has a long history of obesity.  No chest pain or shortness of breath  No asthma or chronic cough or wheezing  No dysuria or hematuria .  No seizures.  No TIAs.  No tremor.  10 years post bilateral knee replacement doing well.  Other joints seem okay.    Review of Systems: A comprehensive review of systems was negative except as noted.     Risk Assessment   Depression: PHQ-9 reviewed; score is 0    Cognitive Impairment:  Mini-Cog reviewed, number of words correctly recalled + clock score: 3 work recall suggesting no dementia    Falls Risk Assessment:  Please see the nursing progress note for questionnaire response  Hearing Assessment:   Please see the nursing progress note for questionnaire response  Home Safety Review:  Please see the nursing progress note for questionnaire response  Diet and exercise reviewed: yes     Medications, Allergies and Immunizations    Current Outpatient Prescriptions   Medication Sig Dispense Refill     levothyroxine (SYNTHROID, LEVOTHROID) 125 MCG tablet TAKE 1 TABLET BY MOUTH DAILY 90 tablet 0     omeprazole (PRILOSEC) 20 MG capsule TAKE 1 CAPSULE BY MOUTH TWICE DAILY 180 capsule 0     venlafaxine (EFFEXOR) 75 MG tablet Take 150 mg by mouth 2 (two) times a day.       No current facility-administered medications for this visit.      Allergies   Allergen Reactions     Penicillins      Immunization History   Administered Date(s) Administered     Tdap 10/23/2008        Family and Social History   Family History   Problem Relation Age of Onset     Hypertension Mother      A&W     ASHLEY disease Mother      Diabetes Father   "    A&W        Social History   Substance Use Topics     Smoking status: Never Smoker     Smokeless tobacco: None     Alcohol use None        Physical Exam   General Appearance:   Obese woman in no distress.  She is a little pale.  Blood pressure initially 1 2400 later 1 4292.    Visit Vitals     Ht 5' 3\" (1.6 m)     Wt (!) 269 lb (122 kg)     BMI 47.65 kg/m2    Body mass index is 47.65 kg/(m^2).    EYES: Eyelids, conjunctiva, and sclera were normal. Pupils were normal. Cornea, iris, and lens were normal bilaterally.  Wears glasses   HEAD, EARS, NOSE, MOUTH, AND THROAT: Head and face were normal. Hearing was normal to voice and the ears were normal to external exam. Nose appearance was normal and there was no discharge. Oropharynx was normal.  NECK: Neck appearance was normal. There were no neck masses and the thyroid was not enlarged.  No bruits  RESPIRATORY: Breathing pattern was normal and the chest moved symmetrically.  Percussion/auscultatory percussion was normal.  Lung sounds were normal and there were no abnormal sounds.  CARDIOVASCULAR: Heart rate and rhythm were normal.  S1 and S2 were normal and there were no extra sounds or murmurs. Peripheral pulses in arms and legs were normal.  Jugular venous pressure was normal.  There was no peripheral edema.  GASTROINTESTINAL: The abdomen was obese in contour.  Bowel sounds were present.  Percussion detected no organ enlargement or tenderness.  Palpation detected no tenderness, mass, or enlarged organs.   MUSCULOSKELETAL: Skeletal configuration was normal and muscle mass was normal for age. Joint appearance was overall normal.  Bilateral total knees in the past  LYMPHATIC: There were no enlarged nodes.  SKIN/HAIR/NAILS: Skin color was normal.  There were no skin lesions.  Hair and nails were normal.  NEUROLOGIC: The patient was alert and oriented to person, place, time, and circumstance. Speech was normal. Cranial nerves were normal. Motor strength was normal for " age. The patient was normally coordinated.  Get up and go test less than 10 seconds.  Clock drawing is normal.  PSYCHIATRIC:  Mood and affect were normal and the patient had normal recent and remote memory. The patient's judgment and insight were normal.    ADDITIONAL VITAL SIGNS: Oxygen saturation 97%  CHEST WALL/BREASTS: No masses.  Mammogram ordered.    RECTAL: Not checked   GENITAL/URINARY: Not checked    Functional status: direct observation reveals these concerns: none  Safety status: direct observation reveals these concerns: none     Additional Information   Counseling and education provided today includes proper nutrition and body habitus, fall prevention, and for those items ordered above. Plan for future preventive services in Patient Instructions, printed on After Visit Summary and given to patient.    In addition to the time spent completing the annual wellness/health maintenance assessments, a total of 40  minutes time of which more than 50% of that time was spent counseling patient on fatigue, weight loss, causes and tingling of the hands and/or coordinating care for pt.  Patient questions answered.  Patient may need an EMG at a later date      Niles Jaramillo MD  Internal Medicine  Contact me at [phone]

## 2021-06-11 NOTE — PROGRESS NOTES
"OFFICE VISIT NOTE    Subjective:   Chief Complaint:  Follow-up (ER f/u 6/1/17 had diarrhea and nausea with HA. didn't like her physician at the ER and left before they d/c'd her. she never started flagyl and her TSH was 23.79 at that visit)    52-year-old with multiple problems was recently in the emergency room with complaint of having some fever and diarrhea.  Symptoms started one day after she was at a graduation.  She does not remember eating anything such as poorly cooked beef etc.  No one else to her knowledge is sick.  Continues to have loose stools.  Minor cramping but definite nausea.  Some headache.    Current Outpatient Prescriptions   Medication Sig     levothyroxine (SYNTHROID) 175 MCG tablet Take 1 tablet (175 mcg total) by mouth daily.     omeprazole (PRILOSEC) 20 MG capsule TAKE 1 CAPSULE BY MOUTH TWICE DAILY     venlafaxine (EFFEXOR) 75 MG tablet Take 150 mg by mouth 2 (two) times a day.     ciprofloxacin HCl (CIPRO) 500 MG tablet Take 1 tablet (500 mg total) by mouth 2 (two) times a day for 7 days.     ondansetron (ZOFRAN, AS HYDROCHLORIDE,) 4 MG tablet Take 1 tablet (4 mg total) by mouth every 8 (eight) hours as needed for nausea.       Review of Systems:  A comprehensive review of systems is negative except for the comments above    Objective:    Pulse 80  Ht 5' 3\" (1.6 m)  Wt (!) 252 lb (114.3 kg)  SpO2 98%  BMI 44.64 kg/m2  GENERAL: No acute distress.  Temperature is 99.  Blood pressure is normal.  Pulse 80.  There is no jaundice.  No scleral icterus.  Mouth and throat look normal.  The abdomen is obese but really with normal bowel sounds.  There is no tenderness or guarding.  No organomegaly or masses.  Flank tenderness.  Heart does show regular rhythm without ectopic beats.    Assessment & Plan   Makenna Black is a 52 y.o. female.    Symptoms sound like gastroenteritis.  She did have a fever with this.  She was supposed to take an antibiotic from the emergency room the left before she got " it filled.  I would put her on Cipro 500 twice daily.  As needed use of Zofran for nausea.  I note that her TSH is still 23.  It was over 102 months ago.  She has been taking thyroid daily.  I told her I would like her to stay on the same dosage of thyroid and repeat the TSH in 3 weeks.  I will adjust thyroid dosage as needed.    Diagnoses and all orders for this visit:    Gastroenteritis  -     ciprofloxacin HCl (CIPRO) 500 MG tablet; Take 1 tablet (500 mg total) by mouth 2 (two) times a day for 7 days.  Dispense: 14 tablet; Refill: 0  -     ondansetron (ZOFRAN, AS HYDROCHLORIDE,) 4 MG tablet; Take 1 tablet (4 mg total) by mouth every 8 (eight) hours as needed for nausea.  Dispense: 10 tablet; Refill: 1    Primary hypothyroidism        Niles Jaramillo MD  Transcription using voice recognition software, may contain typographical errors.

## 2021-06-12 NOTE — PROGRESS NOTES
"OFFICE VISIT NOTE    Subjective:   Chief Complaint:  Follow-up (medication review)    55-year-old woman in for follow-up regarding several problems including obesity status post gastric bypass, iron deficiency anemia, osteoarthritis, polysubstance abuse now maintaining sobriety in remission.  Also hypothyroid.  She seems to be doing well.  She continues off all drugs and alcohol.  Mood is somewhat better.  Denies increasing dyspnea although she does want to get her hemoglobin checked.  No fevers cough or any recent infections.    Current Outpatient Medications   Medication Sig     BD INTEGRA SYRINGE 3 mL 25 gauge x 1\" Syrg USE AS DIRECTED     cyanocobalamin 1,000 mcg/mL injection Inject 1 mL (1,000 mcg total) into the shoulder, thigh, or buttocks every 28 days.     levothyroxine (SYNTHROID, LEVOTHROID) 175 MCG tablet TAKE 1 TABLET(175 MCG) BY MOUTH DAILY     omeprazole (PRILOSEC) 20 MG capsule TAKE 1 CAPSULE BY MOUTH TWICE DAILY     syringe with needle (SYRINGE 3CC/25GX1\") 3 mL 25 gauge x 1\" Syrg Use 1 each As Directed every 30 (thirty) days.     venlafaxine (EFFEXOR) 75 MG tablet Take 150 mg by mouth 2 (two) times a day.     meclizine (ANTIVERT) 25 mg tablet TK 1 T PO Q 6 H PRN FOR VERTIGO       PSFHx: Tobacco Status:  She  reports that she has never smoked. She has never used smokeless tobacco.    Review of Systems:  A comprehensive review of systems is negative except for the comments above    Objective:    Ht 5' 3\" (1.6 m)   Wt (!) 259 lb (117.5 kg)   BMI 45.88 kg/m    GENERAL: No acute distress.  Weight is 10 pounds less than previously.  She still obese.  Pressure 142/84.  Pulse 79.  Oxygen saturation 99%.  No edema  Lungs are clear  Heart shows a regular rhythm without murmur gallop or rub  Thyroid does not seem enlarged.  Neurologic exam seems normal for age.  Significant obesity yet.    Assessment & Plan   Makenna Black is a 55 y.o. female.    She feels stable.  She would like her bloods checked.  Her " substance abuse seems to be under good control at this time.  Diagnoses and all orders for this visit:    Acquired hypothyroidism  -     Thyroid Cascade    Morbid obesity (H)  Continue to advise weight loss.  History of gastric bypass  -     Vitamin B12    History of iron deficiency anemia  -     HM2(CBC w/o Differential)  She has required iron infusions in the past but none in the last 2 years.  History of depression  Continues on venlafaxine.  Need for vaccination  -     Influenza, Recombinant, Inj, Quadrivalent, PF, 18+YRS    Medication management  -     Comprehensive Metabolic Panel    She is due for a mammogram.    The following high BMI interventions were performed this visit: encouragement to exercise    Niles Jaramillo MD  Transcription using voice recognition software, may contain typographical errors.

## 2021-06-15 NOTE — TELEPHONE ENCOUNTER
Pt is calling for refill     Levothyroxine  Omeprazole    [facility] on [facility] - Pt has two days remaining.    [facility] has called pt today and stated advised that they have not heard a response back.    Pt was last seen on 10/26/2020    Pt wants to make sure the [facility] location is used.    Offered to schedule Establish Care appt - pt declined.

## 2021-06-15 NOTE — TELEPHONE ENCOUNTER
1st attempt to contact patient    Left message to call back for: Makenna     Information to relay to patient:  LMTCB    Please advise as below     Sent Cash[employer] message as well     ----- Message from Jesse Gomez MD sent at 2/24/2021  4:44 PM CST -----  pls call:   Low hgb of 9.9, mildly decreased from 4 months ago.   Recommending daily Iron supplementation if not taking already.   Daily supplementation can be sent as a prescription if desired.

## 2021-06-15 NOTE — TELEPHONE ENCOUNTER
Last Office Visit   10/26/20   Notes:    Subjective:   Chief Complaint:  Follow-up (medication review)     55-year-old woman in for follow-up regarding several problems including obesity status post gastric bypass, iron deficiency anemia, osteoarthritis, polysubstance abuse now maintaining sobriety in remission.  Also hypothyroid.  She seems to be doing well.  She continues off all drugs and alcohol.  Mood is somewhat better.  Denies increasing dyspnea although she does want to get her hemoglobin checked.  No fevers cough or any recent infections.       Last Filled:  03/01/2020 Levothyroxine          11/07/19 omeprazole           Next OV:  Visit date not found    LETTER MAILED AND VIA MYCHART-pt needs to be seen to [facility] with New PCP      Medication teed up for provider signature

## 2021-06-15 NOTE — PATIENT INSTRUCTIONS - HE
1. Visit for screening mammogram  - Mammo Screening Bilateral    2. Hypothyroid    3. Heartburn    4. History of gastric bypass  - Vitamin B12    5. Anemia, unspecified type  - HM1(CBC and Differential)    6. Encounter to establish care

## 2021-06-15 NOTE — TELEPHONE ENCOUNTER
Former patient of Clint & has not established care with another provider.  Please assign refill request to covering provider per Clinic standard process.      RN cannot approve Refill Request    RN can NOT refill this medication no pcp. Last office visit: Visit date not found Last Physical: Visit date not found Last MTM visit: Visit date not found Last visit same specialty: 10/26/2020 Niles Jaramillo MD.  Next visit within 3 mo: Visit date not found  Next physical within 3 mo: Visit date not found      Ronni Centeno, [facility] Connection Triage/Med Refill 2/14/2021    Requested Prescriptions   Pending Prescriptions Disp Refills     omeprazole (PRILOSEC) 20 MG capsule [Pharmacy Med Name: OMEPRAZOLE 20MG CAPSULES] 180 capsule 0     Sig: TAKE 1 CAPSULE BY MOUTH TWICE DAILY       GI Medications Refill Protocol Passed - 2/12/2021  2:46 PM        Passed - PCP or prescribing provider visit in last 12 or next 3 months.     Last office visit with prescriber/PCP: Visit date not found OR same dept: Visit date not found OR same specialty: 10/26/2020 Niles Jaramillo MD  Last physical: Visit date not found Last MTM visit: Visit date not found   Next visit within 3 mo: Visit date not found  Next physical within 3 mo: Visit date not found  Prescriber OR PCP: Lex Valerio MD  Last diagnosis associated with med order: 1. Heartburn  - omeprazole (PRILOSEC) 20 MG capsule [Pharmacy Med Name: OMEPRAZOLE 20MG CAPSULES]; TAKE 1 CAPSULE BY MOUTH TWICE DAILY  Dispense: 180 capsule; Refill: 0    If protocol passes may refill for 12 months if within 3 months of last provider visit (or a total of 15 months).

## 2021-06-15 NOTE — TELEPHONE ENCOUNTER
Please let patient know that I sent refill for both. At next establish care visit with Dr. VILLARREAL, they can give more refills.    Dr. Obrien

## 2021-06-15 NOTE — TELEPHONE ENCOUNTER
Called and lvm for patient to call back for message from provider. Also stated a message was sent via imedo that she can read and respond to as well.

## 2021-06-15 NOTE — PROGRESS NOTES
"    Assessment & Plan     Encounter to establish care    Hypothyroid  Lab Results   Component Value Date    TSH 3.37 10/26/2020    Continue current management     Anemia, unspecified type  Recheck   - HM1(CBC and Differential)  Low hgb   Recommending daily Iron supplementation     History of gastric bypass  Recheck   - Vitamin B12  Normal     Visit for screening mammogram  - Mammo Screening Bilateral      30  minutes spent on the date of the encounter doing chart review, review of test results, interpretation of tests, patient visit and documentation        BMI:   Estimated body mass index is 46.6 kg/m  as calculated from the following:    Height as of this encounter: 5' 2.75\" (1.594 m).    Weight as of this encounter: 261 lb (118.4 kg).       No follow-ups on file.    Jesse Gomez MD  [facility]   Makenna Black is 56 y.o. and presents today for healthcare establishment   She just recently had a 3 months refill of her meds, and would like to have her mental health meds managed in primary since been stable for a while and previously managed by her psychiatry.   She is requesting a recheck of her hgb with a history of anemia.   No other concerns.         Review of Systems  A complete 5 point review of systems was obtained and is negative other than what is stated in the HPI.        Objective    /88   Pulse 82   Temp 97.9  F (36.6  C)   Ht 5' 2.75\" (1.594 m)   Wt (!) 261 lb (118.4 kg)   SpO2 97%   BMI 46.60 kg/m    Body mass index is 46.6 kg/m .  Physical Exam  General Appearance:    Alert, well hydrated, no distress   Neck:   Supple, symmetrical, trachea midline, no adenopathy;     thyroid:  no enlargement/tenderness/nodules;    Lungs:     clear to auscultation, no wheezes, rales or rhonchi, symmetric air entry     Heart:    Regular rate and rhythm, S1 and S2 normal, no murmur, rub   or gallop, no edema    Skin:   Skin color, texture, turgor normal, no rashes " or lesions

## 2021-06-16 PROBLEM — E03.9 HYPOTHYROID: Status: ACTIVE | Noted: 2018-02-06

## 2021-06-16 PROBLEM — Z98.84 HISTORY OF GASTRIC BYPASS: Status: ACTIVE | Noted: 2020-10-26

## 2021-06-16 PROBLEM — F19.10 SUBSTANCE ABUSE (H): Chronic | Status: ACTIVE | Noted: 2017-06-05

## 2021-06-16 PROBLEM — E66.01 MORBID OBESITY (H): Status: ACTIVE | Noted: 2020-10-26

## 2021-06-16 PROBLEM — Z86.59 HISTORY OF DEPRESSION: Chronic | Status: ACTIVE | Noted: 2017-06-05

## 2021-06-16 PROBLEM — F60.3 BORDERLINE PERSONALITY DISORDER (H): Chronic | Status: ACTIVE | Noted: 2017-06-05

## 2021-06-16 PROBLEM — R12 HEARTBURN: Status: ACTIVE | Noted: 2018-02-11

## 2021-06-16 PROBLEM — Z86.2 HISTORY OF IRON DEFICIENCY ANEMIA: Status: ACTIVE | Noted: 2020-10-26

## 2021-06-17 NOTE — TELEPHONE ENCOUNTER
Refill Approved    Rx renewed per Medication Renewal Policy. Medication was last renewed on 2/12/21.    Ronni Centeno, [facility] Connection Triage/Med Refill 5/4/2021     Requested Prescriptions   Pending Prescriptions Disp Refills     omeprazole (PRILOSEC) 20 MG capsule [Pharmacy Med Name: OMEPRAZOLE 20MG CAPSULES] 60 capsule 0     Sig: TAKE 1 CAPSULE BY MOUTH TWICE DAILY       GI Medications Refill Protocol Passed - 5/3/2021  1:58 PM        Passed - PCP or prescribing provider visit in last 12 or next 3 months.     Last office visit with prescriber/PCP: Visit date not found OR same dept: Visit date not found OR same specialty: 10/26/2020 Niles Jaramillo MD  Last physical: Visit date not found Last MTM visit: Visit date not found   Next visit within 3 mo: Visit date not found  Next physical within 3 mo: Visit date not found  Prescriber OR PCP: Lex Valerio MD  Last diagnosis associated with med order: 1. Heartburn  - omeprazole (PRILOSEC) 20 MG capsule [Pharmacy Med Name: OMEPRAZOLE 20MG CAPSULES]; TAKE 1 CAPSULE BY MOUTH TWICE DAILY  Dispense: 60 capsule; Refill: 0    2. Hypothyroid  - levothyroxine (SYNTHROID, LEVOTHROID) 175 MCG tablet [Pharmacy Med Name: LEVOTHYROXINE 0.175MG (175MCG) TABS]; TAKE 1 TABLET(175 MCG) BY MOUTH DAILY  Dispense: 90 tablet; Refill: 0    If protocol passes may refill for 12 months if within 3 months of last provider visit (or a total of 15 months).                levothyroxine (SYNTHROID, LEVOTHROID) 175 MCG tablet [Pharmacy Med Name: LEVOTHYROXINE 0.175MG (175MCG) TABS] 90 tablet 0     Sig: TAKE 1 TABLET(175 MCG) BY MOUTH DAILY       Thyroid Hormones Protocol Passed - 5/3/2021  1:58 PM        Passed - Provider visit in past 12 months or next 3 months     Last office visit with prescriber/PCP: Visit date not found OR same dept: Visit date not found OR same specialty: 10/26/2020 Niles Jaramillo MD  Last physical: Visit date not found Last MTM visit: Visit date not found    Next visit within 3 mo: Visit date not found  Next physical within 3 mo: Visit date not found  Prescriber OR PCP: Lex Valerio MD  Last diagnosis associated with med order: 1. Heartburn  - omeprazole (PRILOSEC) 20 MG capsule [Pharmacy Med Name: OMEPRAZOLE 20MG CAPSULES]; TAKE 1 CAPSULE BY MOUTH TWICE DAILY  Dispense: 60 capsule; Refill: 0    2. Hypothyroid  - levothyroxine (SYNTHROID, LEVOTHROID) 175 MCG tablet [Pharmacy Med Name: LEVOTHYROXINE 0.175MG (175MCG) TABS]; TAKE 1 TABLET(175 MCG) BY MOUTH DAILY  Dispense: 90 tablet; Refill: 0    If protocol passes may refill for 12 months if within 3 months of last provider visit (or a total of 15 months).             Passed - TSH on file in past 12 months for patient age 12 & older     TSH   Date Value Ref Range Status   10/26/2020 3.37 0.30 - 5.00 uIU/mL Final

## 2021-06-19 NOTE — LETTER
Letter by Niles Jaramillo MD at      Author: Niles Jaramillo MD Service: -- Author Type: --    Filed:  Encounter Date: 6/25/2019 Status: (Other)         Makenna LOPEZ Wayne  [address] [place] [address]               June 25, 2019    Dear Makenna:    Our records indicate that you are due for a mammogram.    In the [place], one in nine women will develop breast cancer during their lifetime. While there is no way to prevent breast cancer, early detection provides the best opportunity for curing it.    For women over the age of 40, the American Cancer Society recommends a yearly clinical breast exam and a yearly mammogram. These practices have saved thousands of lives. We need your help to ensure that you are receiving optimal medical care.    Please make an appointment for a mammogram at your earliest convenience.    Sincerely,        Niles Jaramillo MD

## 2021-06-19 NOTE — LETTER
Letter by Niles Jaramillo MD at      Author: Niles Jaramillo MD Service: -- Author Type: --    Filed:  Encounter Date: 12/5/2019 Status: Signed         Makenna Black  [address] [place] [address]             December 5, 2019         Dear Ms. Black,    Below are the results from your recent visit:    Resulted Orders   HM2(CBC w/o Differential)   Result Value Ref Range    WBC 6.9 4.0 - 11.0 thou/uL    RBC 4.07 3.80 - 5.40 mill/uL    Hemoglobin 9.7 (L) 12.0 - 16.0 g/dL    Hematocrit 30.9 (L) 35.0 - 47.0 %    MCV 76 (L) 80 - 100 fL    MCH 23.8 (L) 27.0 - 34.0 pg    MCHC 31.3 (L) 32.0 - 36.0 g/dL    RDW 16.7 (H) 11.0 - 14.5 %    Platelets 231 140 - 440 thou/uL    MPV 7.9 7.0 - 10.0 fL   Thyroid Cascade   Result Value Ref Range    TSH 3.83 0.30 - 5.00 uIU/mL   Ferritin   Result Value Ref Range    Ferritin 7 (L) 10 - 130 ng/mL       Hanna, recent labs are reviewed.  TSH looks okay, indicating proper thyroid dosing.  Your ferritin level, measuring iron stores, is low which is not a surprise.  Hemoglobin is only fair at 9.7.  Should it drop under 9, you will require an iron infusion.    Please call with questions or contact us using [employer].    Sincerely,        Electronically signed by Niles Jaramillo MD

## 2021-06-21 NOTE — LETTER
Letter by Niles Jaramillo MD at      Author: Niles Jaramillo MD Service: -- Author Type: --    Filed:  Encounter Date: 10/27/2020 Status: (Other)         Makenna Black  [address] [place] [address]             October 27, 2020         Dear Ms. Black,    Below are the results from your recent visit:    Resulted Orders   HM2(CBC w/o Differential)   Result Value Ref Range    WBC 5.7 4.0 - 11.0 thou/uL    RBC 4.26 3.80 - 5.40 mill/uL    Hemoglobin 10.6 (L) 12.0 - 16.0 g/dL    Hematocrit 33.7 (L) 35.0 - 47.0 %    MCV 79 (L) 80 - 100 fL    MCH 24.8 (L) 27.0 - 34.0 pg    MCHC 31.3 (L) 32.0 - 36.0 g/dL    RDW 17.2 (H) 11.0 - 14.5 %    Platelets 248 140 - 440 thou/uL    MPV 8.3 7.0 - 10.0 fL   Comprehensive Metabolic Panel   Result Value Ref Range    Sodium 139 136 - 145 mmol/L    Potassium 4.4 3.5 - 5.0 mmol/L    Chloride 109 (H) 98 - 107 mmol/L    CO2 22 22 - 31 mmol/L    Anion Gap, Calculation 8 5 - 18 mmol/L    Glucose 95 70 - 125 mg/dL    BUN 15 8 - 22 mg/dL    Creatinine 0.97 0.60 - 1.10 mg/dL    GFR MDRD Af Amer >60 >60 mL/min/1.73m2    GFR MDRD Non Af Amer 59 (L) >60 mL/min/1.73m2    Bilirubin, Total 0.4 0.0 - 1.0 mg/dL    Calcium 9.7 8.5 - 10.5 mg/dL    Protein, Total 6.5 6.0 - 8.0 g/dL    Albumin 4.2 3.5 - 5.0 g/dL    Alkaline Phosphatase 104 45 - 120 U/L    AST 38 0 - 40 U/L    ALT 59 (H) 0 - 45 U/L    Narrative    Fasting Glucose reference range is 70-99 mg/dL per  American Diabetes Association (ADA) guidelines.   Thyroid Cascade   Result Value Ref Range    TSH 3.37 0.30 - 5.00 uIU/mL   Vitamin B12   Result Value Ref Range    Vitamin B-12 570 213 - 816 pg/mL       Glenys, recent labs are reviewed.  Hemoglobin was 10.6 which is a little low.  The MCV is 79 which does suggest iron deficiency.  Electrolytes looked okay.  Creatinine, a measure of kidney function, remains normal.  Slight elevation of ALT which is a liver function test.  I do not think that is significant.  TSH and B12 levels looked  okay.  I do not think you need any iron infusion at this time.  Let us recheck your hemoglobin in 3 months.  Please call with questions or contact us using Kindling[employer].    Sincerely,        Electronically signed by Niles Jaramillo MD

## 2021-06-21 NOTE — LETTER
Letter by Jesse Gomez MD at      Author: Jesse Gomez MD Service: -- Author Type: --    Filed:  Encounter Date: 2/24/2021 Status: (Other)         Makenna Black  [address] [place] [address]             February 24, 2021         Dear MsIsela Black,    Below are the results from your recent visit:   Stable B12    Continue current management    Resulted Orders   Vitamin B12   Result Value Ref Range    Vitamin B-12 670 213 - 816 pg/mL   HM1 (CBC with Diff)   Result Value Ref Range    WBC 5.1 4.0 - 11.0 thou/uL    RBC 4.08 3.80 - 5.40 mill/uL    Hemoglobin 9.9 (L) 12.0 - 16.0 g/dL    Hematocrit 33.1 (L) 35.0 - 47.0 %    MCV 81 80 - 100 fL    MCH 24.3 (L) 27.0 - 34.0 pg    MCHC 29.9 (L) 32.0 - 36.0 g/dL    RDW 16.5 (H) 11.0 - 14.5 %    Platelets 219 140 - 440 thou/uL    MPV 10.4 (H) 7.0 - 10.0 fL    Neutrophils % 67 50 - 70 %    Lymphocytes % 23 20 - 40 %    Monocytes % 9 2 - 10 %    Eosinophils % 0 0 - 6 %    Basophils % 1 0 - 2 %    Immature Granulocyte % 0 <=0 %    Neutrophils Absolute 3.4 2.0 - 7.7 thou/uL    Lymphocytes Absolute 1.2 0.8 - 4.4 thou/uL    Monocytes Absolute 0.5 0.0 - 0.9 thou/uL    Eosinophils Absolute 0.0 0.0 - 0.4 thou/uL    Basophils Absolute 0.0 0.0 - 0.2 thou/uL    Immature Granulocyte Absolute 0.0 <=0.0 thou/uL           Please call with questions or contact us using [employer].    Sincerely,        Electronically signed by Jesse Gomez MD

## 2021-06-21 NOTE — LETTER
Letter by Niles Jaramillo MD at      Author: Niles Jaramillo MD Service: -- Author Type: --    Filed:  Encounter Date: 2/12/2021 Status: (Other)       Makenna LOPEZ Wayne  [address] [place] [address]      02/12/21    Dear Makenna,    We have received your medical message.  Effective December 6th, 2020, Dr Jaramillo, will no longer see patients at [facility] due to FDC. Our clinic staff is dedicated to helping you make a smooth transition to a new healthcare provider. The following providers at our clinic would be happy to partner with you for your  healthcare needs:      Jesse Meneses MD Family Medicine    Malena Sands MD Family Medicine    Mirlande Gilmore, MOHAMUD Internal Medicine    Rama Valerio MD Internal Medicine    Ree Hay MD Family Medicine     Please note: If you currently have a prescription for medication and have no refills remaining, you need to establish care with a new provider before getting your prescription refilled.      If you have any questions or want more information about providers at our clinic or other [facility], please visit [email or web address] or call [phone] (toll-free). Thank you for choosing [facility] for your medical care.    I wish you and your family the very best of [facility].    Sincerely,    Katlyn Damon   [facility]  [facility]  [facility] [address]  [phone]

## 2021-08-05 DIAGNOSIS — F32.89 OTHER DEPRESSION: ICD-10-CM

## 2021-08-05 DIAGNOSIS — E03.9 HYPOTHYROIDISM, UNSPECIFIED TYPE: Primary | ICD-10-CM

## 2021-08-05 RX ORDER — LEVOTHYROXINE SODIUM 75 UG/1
75 TABLET ORAL DAILY
Qty: 90 TABLET | Refills: 0 | Status: SHIPPED | OUTPATIENT
Start: 2021-08-05 | End: 2021-11-12

## 2021-08-05 RX ORDER — VENLAFAXINE 75 MG/1
75 TABLET ORAL 2 TIMES DAILY
Qty: 180 TABLET | Refills: 0 | Status: SHIPPED | OUTPATIENT
Start: 2021-08-05 | End: 2021-11-12

## 2021-08-05 NOTE — TELEPHONE ENCOUNTER
Last visit: 2/24/2021-Dr Gomez  1. Visit for screening mammogram  - Mammo Screening Bilateral     2. Hypothyroid     3. Heartburn     4. History of gastric bypass  - Vitamin B12     5. Anemia, unspecified type  - HM1(CBC and Differential)     6. Encounter to establish care       Last filled:   venlafaxine (EFFEXOR) 75 MG tablet     --   Sig - Route: Take 150 mg by mouth 2 (two) times a day. - Oral   Class: Historical Med     levothyroxine (SYNTHROID, LEVOTHROID) 175 MCG tablet 90 tablet 1 5/4/2021  No   Sig: TAKE 1 TABLET(175 MCG) BY MOUTH DAILY   Sent to pharmacy as: levothyroxine 175 mcg tablet (SYNTHROID, LEVOTHROID)   E-Prescribing Status: Receipt confirmed by pharmacy (5/4/2021  1:19 PM CDT)     Next visit: nothing scheduled at this time

## 2021-08-05 NOTE — TELEPHONE ENCOUNTER
Reason for Call:  Medication or medication refill:    Do you use a [facility]?  Name of the pharmacy and phone number for the current request:      [facility] on [facility]    Name of the medication requested:     Levothyroxine 0.175 mcg    Venlafaxine HCl 75 mg tablet.  Taking 2 tablets 1x daily.    Other request: Patient was getting the Venlafaxine Rx from her psych provider, who has since retired.  Please notify patient if refill unable to be processed.    Can we leave a detailed message on this number? YES    Phone number patient can be reached at: Home number on file [phone] (home)    Best Time: Any time    Call taken on 8/5/2021 at 9:59 AM by Luca Babcock

## 2021-08-05 NOTE — TELEPHONE ENCOUNTER
Left message for patient to call back regarding refills. When she calls back please relay message below and assist as needed.

## 2021-08-08 ENCOUNTER — TELEPHONE (OUTPATIENT)
Dept: LAB | Facility: CLINIC | Age: 56
End: 2021-08-08

## 2021-08-09 ENCOUNTER — LAB (OUTPATIENT)
Dept: LAB | Facility: CLINIC | Age: 56
End: 2021-08-09
Payer: MEDICARE

## 2021-08-09 DIAGNOSIS — E03.9 HYPOTHYROIDISM, UNSPECIFIED TYPE: ICD-10-CM

## 2021-08-09 DIAGNOSIS — Z98.84 HISTORY OF GASTRIC BYPASS: ICD-10-CM

## 2021-08-09 DIAGNOSIS — Z86.2 HISTORY OF IRON DEFICIENCY ANEMIA: ICD-10-CM

## 2021-08-09 DIAGNOSIS — E03.9 HYPOTHYROIDISM, UNSPECIFIED TYPE: Primary | ICD-10-CM

## 2021-08-09 LAB — TSH SERPL DL<=0.005 MIU/L-ACNC: 0.41 UIU/ML (ref 0.3–5)

## 2021-08-09 PROCEDURE — 36415 COLL VENOUS BLD VENIPUNCTURE: CPT

## 2021-08-09 PROCEDURE — 84443 ASSAY THYROID STIM HORMONE: CPT

## 2021-08-29 ENCOUNTER — HEALTH MAINTENANCE LETTER (OUTPATIENT)
Age: 56
End: 2021-08-29

## 2021-09-08 ENCOUNTER — RECORDS - HEALTHEAST (OUTPATIENT)
Dept: ADMINISTRATIVE | Facility: OTHER | Age: 56
End: 2021-09-08

## 2021-10-07 ENCOUNTER — TELEPHONE (OUTPATIENT)
Dept: FAMILY MEDICINE | Facility: CLINIC | Age: 56
End: 2021-10-07

## 2021-10-07 RX ORDER — CYANOCOBALAMIN 1000 UG/ML
1000 INJECTION, SOLUTION INTRAMUSCULAR; SUBCUTANEOUS
COMMUNITY
Start: 2020-10-26 | End: 2021-10-08

## 2021-10-07 RX ORDER — NEEDLES, FILTER 19GX1 1/2"
NEEDLE, DISPOSABLE MISCELLANEOUS
COMMUNITY
Start: 2020-10-26 | End: 2021-10-08

## 2021-10-07 NOTE — TELEPHONE ENCOUNTER
Reason for Call:  Medication or medication refill:    Do you use a [facility]?  Name of the pharmacy and phone number for the current request:  [facility] on file    Name of the medication requested: cyanocobalamin 1,000 mcg/mL injection    Other request: Patient called to request provider to send RX to pharmacy for b12 and syringes. Patient is requesting a 3 months supply.    Can we leave a detailed message on this number? YES    Phone number patient can be reached at: Home number on file [phone] (home)    Best Time: any    Call taken on 10/7/2021 at 2:16 PM by Marilin Rios

## 2021-10-08 DIAGNOSIS — E53.8 VITAMIN B12 DEFICIENCY (NON ANEMIC): Primary | ICD-10-CM

## 2021-10-08 NOTE — TELEPHONE ENCOUNTER
Pending Prescriptions:                       Disp   Refills    cyanocobalamin (CYANOCOBALAMIN) 1000 MCG/*       3            Sig: Inject 1 mL (1,000 mcg) into the muscle    syringe/needle (disp) (BD INTEGRA SYRINGE*                    Sig: See Admin Instructions

## 2021-10-10 RX ORDER — CYANOCOBALAMIN 1000 UG/ML
1000 INJECTION, SOLUTION INTRAMUSCULAR; SUBCUTANEOUS
Qty: 3 ML | Refills: 1 | Status: SHIPPED | OUTPATIENT
Start: 2021-10-10 | End: 2022-04-25

## 2021-10-10 NOTE — TELEPHONE ENCOUNTER
"  Disp Refills Start End JERONIMO    syringe with needle (SYRINGE 3CC/25GX1\") 3 mL 25 gauge x 1\" Syrg 12 Syringe 0 8/6/2018  --   Sig - Route: Use 1 each As Directed every 30 (thirty) days. - Miscellaneous   Class: Print       syringe with needle (SYRINGE 3CC/25GX1\") 3 mL 25 gauge x 1\" Syrg [[id number]]    Electronically signed by: Niles Jaramillo MD on 08/06/18 0823 Status: Active   Ordering user: Niles Jaramillo MD 08/06/18 0823 Authorized by: Niles Jaramillo MD   Frequency: Q30 Days 08/06/18 - Until Discontinued Released by: Niles Jaramillo MD 08/06/18 0823     Routing refill request to provider for review/approval because:  Drug not on the [facility] refill protocol     Last Written Prescription Date:  8/6/2018  Last Fill Quantity: 12,  # refills: 0   Last office visit provider:  2/24/21     Requested Prescriptions   Pending Prescriptions Disp Refills     syringe/needle (disp) (BD INTEGRA SYRINGE) 25G X 1\" 3 ML MISC       Sig: See Admin Instructions       There is no refill protocol information for this order      Signed Prescriptions Disp Refills    cyanocobalamin (CYANOCOBALAMIN) 1000 MCG/ML injection 3 mL 1     Sig: Inject 1 mL (1,000 mcg) into the muscle every 30 days       Vitamin Supplements (Adult) Protocol Passed - 10/8/2021  4:51 PM        Passed - High dose Vitamin D not ordered        Passed - Recent (12 mo) or future (30 days) visit within the authorizing provider's specialty     Patient has had an office visit with the authorizing provider or a provider within the authorizing providers department within the previous 12 mos or has a future within next 30 days. See \"Patient Info\" tab in inbasket, or \"Choose Columns\" in Meds & Orders section of the refill encounter.              Passed - Medication is active on med list             Ronni Centeno RN 10/10/21 10:46 AM  "

## 2021-10-10 NOTE — TELEPHONE ENCOUNTER
"Disp Refills Start End JERONIMO    cyanocobalamin 1,000 mcg/mL injection 3 mL 3 10/26/2020  No   Sig - Route: Inject 1 mL (1,000 mcg total) into the shoulder, thigh, or buttocks every 28 days. - Intramuscular   Sent to pharmacy as: cyanocobalamin (vit B-12) 1,000 mcg/mL injection solution   E-Prescribing Status: Receipt confirmed by pharmacy (10/26/2020 12:43 PM CDT)     Last Written Prescription Date:  10/26/20  Last Fill Quantity: 3 ml,  # refills: 3   Last office visit provider:  2/24/21     Requested Prescriptions   Pending Prescriptions Disp Refills     cyanocobalamin (CYANOCOBALAMIN) 1000 MCG/ML injection  3     Sig: Inject 1 mL (1,000 mcg) into the muscle       Vitamin Supplements (Adult) Protocol Passed - 10/8/2021  4:51 PM        Passed - High dose Vitamin D not ordered        Passed - Recent (12 mo) or future (30 days) visit within the authorizing provider's specialty     Patient has had an office visit with the authorizing provider or a provider within the authorizing providers department within the previous 12 mos or has a future within next 30 days. See \"Patient Info\" tab in inbasket, or \"Choose Columns\" in Meds & Orders section of the refill encounter.              Passed - Medication is active on med list           syringe/needle (disp) (BD INTEGRA SYRINGE) 25G X 1\" 3 ML MISC       Sig: See Admin Instructions       There is no refill protocol information for this order          Ronni Centeno RN 10/10/21 10:45 AM  "

## 2021-10-11 RX ORDER — NEEDLES, FILTER 19GX1 1/2"
NEEDLE, DISPOSABLE MISCELLANEOUS
Qty: 50 EACH | Refills: 0 | Status: SHIPPED | OUTPATIENT
Start: 2021-10-11

## 2021-10-24 ENCOUNTER — HEALTH MAINTENANCE LETTER (OUTPATIENT)
Age: 56
End: 2021-10-24

## 2021-11-10 ENCOUNTER — TELEPHONE (OUTPATIENT)
Dept: FAMILY MEDICINE | Facility: CLINIC | Age: 56
End: 2021-11-10
Payer: MEDICARE

## 2021-11-10 NOTE — TELEPHONE ENCOUNTER
Pt requested refills last week at [facility] on [facility].  She was advised today to reach out to PCP as they have not gotten a reply back.    Pt is out of the following medications.    Venlafaxine 75 mg  Levothyroxine 75 MCG    [facility] on [facility] is the preferred pharmacy.    Pt would like to know which Covid booster vaccine PCP recommends.  She had the J&J vaccine.    Requesting call back at

## 2021-11-10 NOTE — TELEPHONE ENCOUNTER
Called patient and relayed msg from Patricia. Let her know that medications were sent in for refill

## 2021-11-12 ENCOUNTER — TELEPHONE (OUTPATIENT)
Dept: FAMILY MEDICINE | Facility: CLINIC | Age: 56
End: 2021-11-12
Payer: MEDICARE

## 2021-11-12 DIAGNOSIS — E03.9 HYPOTHYROIDISM, UNSPECIFIED TYPE: ICD-10-CM

## 2021-11-12 DIAGNOSIS — F32.89 OTHER DEPRESSION: ICD-10-CM

## 2021-11-12 RX ORDER — VENLAFAXINE 75 MG/1
75 TABLET ORAL 2 TIMES DAILY
Qty: 180 TABLET | Refills: 0 | Status: SHIPPED | OUTPATIENT
Start: 2021-11-12 | End: 2022-04-25

## 2021-11-12 RX ORDER — LEVOTHYROXINE SODIUM 75 UG/1
75 TABLET ORAL DAILY
Qty: 90 TABLET | Refills: 0 | Status: SHIPPED | OUTPATIENT
Start: 2021-11-12 | End: 2022-04-25

## 2021-11-12 NOTE — TELEPHONE ENCOUNTER
Patient is very frustrated and upset.    Spoke to Care team on Wednesday afternoon - assured RX were sent in.    The pharmacy does not have the RX     She needs this done today, she wants a call once the RX has been sent to the pharmacy and confirmed they have it.    This is not acceptable care and she is very disappointed.    Venlafazine 75mg  Levothyrozine 75 MCG

## 2021-11-12 NOTE — TELEPHONE ENCOUNTER
Patient is looking for update on RX.     She is very upset and frustrated that the RX has not been sent.

## 2021-11-12 NOTE — TELEPHONE ENCOUNTER
Writer called patient who did not answer, message was left on V M that her script was re-sent. And an apology for the hassle.

## 2022-01-02 ENCOUNTER — E-VISIT (OUTPATIENT)
Dept: URGENT CARE | Facility: CLINIC | Age: 57
End: 2022-01-02
Payer: MEDICARE

## 2022-01-02 DIAGNOSIS — Z03.818 ENCOUNTER FOR PATIENT CONCERN ABOUT EXPOSURE TO INFECTIOUS ORGANISM: Primary | ICD-10-CM

## 2022-01-02 PROCEDURE — 99207 PR NO BILLABLE SERVICE THIS VISIT: CPT | Performed by: NURSE PRACTITIONER

## 2022-01-03 NOTE — PATIENT INSTRUCTIONS
Dear Makenna Black,    Based on the details you've shared, you do not need to be tested at this time and may continue to work.     Several reasons that people seek testing but where testing is not currently recommended:     You have not had direct contact with someone who has a confirmed (tested) positive case of Covid-19. An example is if your family member (e.g. child) was exposed to someone with Covid-19 and you have been exposed to your family member, THEY should get tested and you only need to be tested if they are positive.     We do not currently recommend testing for people who will be coming in contact with high risk people if you do not have symptoms right now (e.g. we do not test people prior to going to visit elderly or sick relatives).     We do not recommend testing for people who previously tested positive to see if they are still contagious. This is because the test can remain positive for up to 90 days after the first Covid test. We consider you non-contagious 10 days after your covid symptoms started or 20 days if you are immunocompromised. So you may return to normal activities 10 days (or 20 if immunocompromised) after symptoms started and do not need to be re-tested.     Please submit a new visit or call your clinic if you think we missed needed information, your exposure information has changed, or you develop symptoms.    What are the symptoms of COVID-19?  The most common symptoms are cough, fever and trouble breathing. Less common symptoms include headache, body aches, fatigue (feeling very tired), chills, sore throat, stuffy or runny nose, diarrhea (loose poop), loss of taste or smell, belly pain, and nausea or vomiting (feeling sick to your stomach or throwing up).    Where can I get more information?  [facility] - About COVID-19: [email or web address]  CDC - What to Do If You're Sick: www.cdc.gov/coronavirus/2019-ncov/about/steps-when-sick.html  CDC - [facility]  Isolation: www.cdc.gov/coronavirus/2019-ncov/hcp/disposition-in-home-patients.html  CDC - Caring for Someone: www.cdc.gov/coronavirus/2019-ncov/if-you-are-sick/care-for-someone.html  [facility] - Interim Guidance for Hospital Discharge to Home: www.[facility].[place]/diseases/coronavirus/hcp/hospdischarge.pdf  [facility] clinical trials (COVID-19 research studies): [email or web address].[email or web address]-clinical-trials  Below are the COVID-19 hotlines at the [place] Department of Health ([facility]). Interpreters are available.  For health questions: Call 727-949-6589 or 1-990.917.8498 (7 a.m. to 7 p.m.)  For questions about schools and childcare: Call 971-444-3414 or 1-733.228.2850 (7 a.m. to 7 p.m.)

## 2022-01-19 ENCOUNTER — OFFICE VISIT (OUTPATIENT)
Dept: FAMILY MEDICINE | Facility: CLINIC | Age: 57
End: 2022-01-19
Payer: MEDICARE

## 2022-01-19 VITALS
SYSTOLIC BLOOD PRESSURE: 124 MMHG | DIASTOLIC BLOOD PRESSURE: 68 MMHG | HEART RATE: 82 BPM | BODY MASS INDEX: 46.03 KG/M2 | WEIGHT: 257.8 LBS

## 2022-01-19 DIAGNOSIS — M54.50 LUMBAR BACK PAIN: ICD-10-CM

## 2022-01-19 DIAGNOSIS — M54.50 LUMBAR BACK PAIN: Primary | ICD-10-CM

## 2022-01-19 DIAGNOSIS — M25.552 HIP PAIN, LEFT: ICD-10-CM

## 2022-01-19 PROCEDURE — 99213 OFFICE O/P EST LOW 20 MIN: CPT | Performed by: NURSE PRACTITIONER

## 2022-01-19 RX ORDER — CYCLOBENZAPRINE HCL 5 MG
5 TABLET ORAL 3 TIMES DAILY PRN
Qty: 30 TABLET | Refills: 0 | Status: SHIPPED | OUTPATIENT
Start: 2022-01-19 | End: 2022-06-20

## 2022-01-19 RX ORDER — CELECOXIB 100 MG/1
100 CAPSULE ORAL 2 TIMES DAILY
Qty: 90 CAPSULE | Refills: 0 | Status: SHIPPED | OUTPATIENT
Start: 2022-01-19 | End: 2022-01-19

## 2022-01-19 NOTE — PROGRESS NOTES
"  Assessment & Plan     Lumbar back pain  I suspect that the shae of her back pain is likely due to significant osteoarthritis in her left hip.  Going to start her on a short-term NSAID burst and Flexeril for a short time while she follows up with Ortho    celecoxib (CELEBREX) 100 MG capsule; Take 1 capsule (100 mg) by mouth 2 times daily  - cyclobenzaprine (FLEXERIL) 5 MG tablet; Take 1 tablet (5 mg) by mouth 3 times daily as needed for muscle spasms    Hip pain, left  My suspicion is that she has a fair amount of arthritis in her left hip.  I would like her to see a specialist about this.  We are going to defer imaging for now    - Orthopedic  Referral; Future    Patient Instructions   Start Celebrex 100 mg twice daily for back and hip pain.  Take with food and drink plenty of water.    You can take additional Tylenol throughout the day as needed.    Call [facility] about making an appointment for your back and hip pain.  [phone].       BMI:   Estimated body mass index is 46.03 kg/m  as calculated from the following:    Height as of 2/24/21: 1.594 m (5' 2.75\").    Weight as of this encounter: 116.9 kg (257 lb 12.8 oz).   Weight management plan: Discussed healthy diet and exercise guidelines        Return in about 3 months (around 4/19/2022) for Follow up.    Piyush Cevallos, [facility]    Raquel Veronica is a 57 year old who presents for the following health issues     HPI     Has had left sided lumbar back pain for the better part of 1 month, seems to be getting worse.  Pain radiates down into her left groin area.    Pain is worse with twisting bending and lifting.    Seems to be worse with positional changes.    No significant prior history of lumbar back pain although she is morbidly obese with a BMI of 46.    No significant left hip history.      Review of Systems   Constitutional, HEENT, cardiovascular, pulmonary, gi and gu systems are " negative, except as otherwise noted.      Objective    /68 (BP Location: Right arm, Patient Position: Sitting, Cuff Size: Adult Large)   Pulse 82   Wt 116.9 kg (257 lb 12.8 oz)   BMI 46.03 kg/m    Body mass index is 46.03 kg/m .  Physical Exam     Pain with palpation over the left SI joint area.  Significant amount of tenderness with flexion and external rotation of the left hip.  She also has a small amount of tenderness with palpation over the left groin area.  She has no CVA tenderness bilaterally.  No tenderness to palpation to the mid spine processes

## 2022-01-19 NOTE — PATIENT INSTRUCTIONS
Start Celebrex 100 mg twice daily for back and hip pain.  Take with food and drink plenty of water.    You can take additional Tylenol throughout the day as needed.    Call [facility] about making an appointment for your back and hip pain.  [phone].

## 2022-01-20 RX ORDER — CELECOXIB 100 MG/1
100 CAPSULE ORAL 2 TIMES DAILY
Qty: 180 CAPSULE | Refills: 0 | Status: SHIPPED | OUTPATIENT
Start: 2022-01-20 | End: 2022-06-20

## 2022-02-09 DIAGNOSIS — K21.9 GASTROESOPHAGEAL REFLUX DISEASE WITHOUT ESOPHAGITIS: Primary | ICD-10-CM

## 2022-02-09 NOTE — TELEPHONE ENCOUNTER
Pending Prescriptions:                       Disp   Refills    omeprazole (PRILOSEC) 20 MG DR capsule                        Sig: Take 1 capsule (20 mg) by mouth 2 times daily

## 2022-04-22 DIAGNOSIS — E03.9 HYPOTHYROIDISM, UNSPECIFIED TYPE: ICD-10-CM

## 2022-04-22 DIAGNOSIS — F32.89 OTHER DEPRESSION: ICD-10-CM

## 2022-04-22 DIAGNOSIS — E53.8 VITAMIN B12 DEFICIENCY (NON ANEMIC): ICD-10-CM

## 2022-04-22 NOTE — TELEPHONE ENCOUNTER
Pending Prescriptions:                       Disp   Refills    venlafaxine (EFFEXOR) 75 MG tablet        180 ta*0            Sig: Take 1 tablet (75 mg) by mouth 2 times daily    levothyroxine (SYNTHROID) 75 MCG tablet   90 tab*0            Sig: Take 1 tablet (75 mcg) by mouth daily    cyanocobalamin (CYANOCOBALAMIN) 1000 MCG/*3 mL   1            Sig: Inject 1 mL (1,000 mcg) into the muscle every 30           days

## 2022-04-25 RX ORDER — LEVOTHYROXINE SODIUM 75 UG/1
75 TABLET ORAL DAILY
Qty: 30 TABLET | Refills: 0 | Status: SHIPPED | OUTPATIENT
Start: 2022-04-25 | End: 2022-06-06

## 2022-04-25 RX ORDER — CYANOCOBALAMIN 1000 UG/ML
1000 INJECTION, SOLUTION INTRAMUSCULAR; SUBCUTANEOUS
Qty: 3 ML | Refills: 0 | Status: SHIPPED | OUTPATIENT
Start: 2022-04-25 | End: 2022-06-25

## 2022-04-25 RX ORDER — VENLAFAXINE 75 MG/1
75 TABLET ORAL 2 TIMES DAILY
Qty: 60 TABLET | Refills: 0 | Status: SHIPPED | OUTPATIENT
Start: 2022-04-25 | End: 2022-06-06

## 2022-06-02 DIAGNOSIS — E03.9 HYPOTHYROIDISM, UNSPECIFIED TYPE: ICD-10-CM

## 2022-06-02 DIAGNOSIS — K21.9 GASTROESOPHAGEAL REFLUX DISEASE WITHOUT ESOPHAGITIS: ICD-10-CM

## 2022-06-02 DIAGNOSIS — F32.89 OTHER DEPRESSION: ICD-10-CM

## 2022-06-02 NOTE — TELEPHONE ENCOUNTER
Patient calling to get  medication refills on medications attached.    Patient has the first available appt with provider on 6/20/2022    Call Back   [phone]

## 2022-06-06 RX ORDER — VENLAFAXINE 75 MG/1
75 TABLET ORAL 2 TIMES DAILY
Qty: 60 TABLET | Refills: 0 | Status: SHIPPED | OUTPATIENT
Start: 2022-06-06 | End: 2022-06-20

## 2022-06-06 RX ORDER — LEVOTHYROXINE SODIUM 75 UG/1
75 TABLET ORAL DAILY
Qty: 30 TABLET | Refills: 0 | Status: SHIPPED | OUTPATIENT
Start: 2022-06-06 | End: 2022-06-20

## 2022-06-06 NOTE — TELEPHONE ENCOUNTER
Patient is following up RX for venlafaxine.  She will be out of medication on Thursday requesting to be bridged until 06/20 appointment if refill can not be provided.

## 2022-06-15 ASSESSMENT — ENCOUNTER SYMPTOMS
JOINT SWELLING: 0
COUGH: 0
WEAKNESS: 0
DIARRHEA: 0
HEARTBURN: 1
PARESTHESIAS: 0
ARTHRALGIAS: 1
PALPITATIONS: 0
HEMATOCHEZIA: 0
EYE PAIN: 0
CONSTIPATION: 0
MYALGIAS: 0
DIZZINESS: 0
NERVOUS/ANXIOUS: 0
DYSURIA: 0
FEVER: 0
CHILLS: 0
HEMATURIA: 0
HEADACHES: 0
SORE THROAT: 0
FREQUENCY: 0
SHORTNESS OF BREATH: 0
ABDOMINAL PAIN: 0
NAUSEA: 0

## 2022-06-15 ASSESSMENT — ACTIVITIES OF DAILY LIVING (ADL): CURRENT_FUNCTION: NO ASSISTANCE NEEDED

## 2022-06-20 ENCOUNTER — OFFICE VISIT (OUTPATIENT)
Dept: FAMILY MEDICINE | Facility: CLINIC | Age: 57
End: 2022-06-20
Payer: MEDICARE

## 2022-06-20 VITALS
SYSTOLIC BLOOD PRESSURE: 130 MMHG | OXYGEN SATURATION: 97 % | HEART RATE: 69 BPM | TEMPERATURE: 98.3 F | RESPIRATION RATE: 20 BRPM | BODY MASS INDEX: 45.71 KG/M2 | DIASTOLIC BLOOD PRESSURE: 86 MMHG | HEIGHT: 63 IN | WEIGHT: 258 LBS

## 2022-06-20 DIAGNOSIS — Z98.84 HISTORY OF GASTRIC BYPASS: ICD-10-CM

## 2022-06-20 DIAGNOSIS — E53.8 VITAMIN B12 DEFICIENCY (NON ANEMIC): ICD-10-CM

## 2022-06-20 DIAGNOSIS — Z12.31 ENCOUNTER FOR SCREENING MAMMOGRAM FOR BREAST CANCER: ICD-10-CM

## 2022-06-20 DIAGNOSIS — Z00.00 ENCOUNTER FOR MEDICARE ANNUAL WELLNESS EXAM: Primary | ICD-10-CM

## 2022-06-20 DIAGNOSIS — F32.89 OTHER DEPRESSION: ICD-10-CM

## 2022-06-20 DIAGNOSIS — Z13.220 SCREENING FOR HYPERLIPIDEMIA: ICD-10-CM

## 2022-06-20 DIAGNOSIS — Z12.11 SPECIAL SCREENING FOR MALIGNANT NEOPLASMS, COLON: ICD-10-CM

## 2022-06-20 DIAGNOSIS — E03.9 HYPOTHYROIDISM, UNSPECIFIED TYPE: ICD-10-CM

## 2022-06-20 DIAGNOSIS — K21.9 GASTROESOPHAGEAL REFLUX DISEASE WITHOUT ESOPHAGITIS: ICD-10-CM

## 2022-06-20 DIAGNOSIS — Z12.31 VISIT FOR SCREENING MAMMOGRAM: ICD-10-CM

## 2022-06-20 LAB
ALBUMIN SERPL-MCNC: 4.1 G/DL (ref 3.5–5)
ALP SERPL-CCNC: 88 U/L (ref 45–120)
ALT SERPL W P-5'-P-CCNC: 39 U/L (ref 0–45)
ANION GAP SERPL CALCULATED.3IONS-SCNC: 11 MMOL/L (ref 5–18)
AST SERPL W P-5'-P-CCNC: 34 U/L (ref 0–40)
BILIRUB SERPL-MCNC: 0.4 MG/DL (ref 0–1)
BUN SERPL-MCNC: 17 MG/DL (ref 8–22)
CALCIUM SERPL-MCNC: 10.3 MG/DL (ref 8.5–10.5)
CHLORIDE BLD-SCNC: 109 MMOL/L (ref 98–107)
CHOLEST SERPL-MCNC: 166 MG/DL
CO2 SERPL-SCNC: 19 MMOL/L (ref 22–31)
CREAT SERPL-MCNC: 1.01 MG/DL (ref 0.6–1.1)
ERYTHROCYTE [DISTWIDTH] IN BLOOD BY AUTOMATED COUNT: 17 % (ref 10–15)
FASTING STATUS PATIENT QL REPORTED: NORMAL
GFR SERPL CREATININE-BSD FRML MDRD: 64 ML/MIN/1.73M2
GLUCOSE BLD-MCNC: 92 MG/DL (ref 70–125)
HBA1C MFR BLD: 5.2 % (ref 0–5.6)
HCT VFR BLD AUTO: 32.2 % (ref 35–47)
HDLC SERPL-MCNC: 55 MG/DL
HGB BLD-MCNC: 9.6 G/DL (ref 11.7–15.7)
LDLC SERPL CALC-MCNC: 85 MG/DL
MCH RBC QN AUTO: 23.9 PG (ref 26.5–33)
MCHC RBC AUTO-ENTMCNC: 29.8 G/DL (ref 31.5–36.5)
MCV RBC AUTO: 80 FL (ref 78–100)
PLATELET # BLD AUTO: 233 10E3/UL (ref 150–450)
POTASSIUM BLD-SCNC: 4.6 MMOL/L (ref 3.5–5)
PROT SERPL-MCNC: 6.6 G/DL (ref 6–8)
RBC # BLD AUTO: 4.02 10E6/UL (ref 3.8–5.2)
SODIUM SERPL-SCNC: 139 MMOL/L (ref 136–145)
TRIGL SERPL-MCNC: 132 MG/DL
TSH SERPL DL<=0.005 MIU/L-ACNC: 71.95 UIU/ML (ref 0.3–5)
VIT B12 SERPL-MCNC: 542 PG/ML (ref 213–816)
WBC # BLD AUTO: 5.8 10E3/UL (ref 4–11)

## 2022-06-20 PROCEDURE — 82607 VITAMIN B-12: CPT | Performed by: FAMILY MEDICINE

## 2022-06-20 PROCEDURE — 83036 HEMOGLOBIN GLYCOSYLATED A1C: CPT | Performed by: FAMILY MEDICINE

## 2022-06-20 PROCEDURE — 84439 ASSAY OF FREE THYROXINE: CPT | Performed by: FAMILY MEDICINE

## 2022-06-20 PROCEDURE — G0439 PPPS, SUBSEQ VISIT: HCPCS | Performed by: FAMILY MEDICINE

## 2022-06-20 PROCEDURE — 36415 COLL VENOUS BLD VENIPUNCTURE: CPT | Performed by: FAMILY MEDICINE

## 2022-06-20 PROCEDURE — 84443 ASSAY THYROID STIM HORMONE: CPT | Performed by: FAMILY MEDICINE

## 2022-06-20 PROCEDURE — 99214 OFFICE O/P EST MOD 30 MIN: CPT | Mod: 25 | Performed by: FAMILY MEDICINE

## 2022-06-20 PROCEDURE — 85027 COMPLETE CBC AUTOMATED: CPT | Performed by: FAMILY MEDICINE

## 2022-06-20 PROCEDURE — 80061 LIPID PANEL: CPT | Performed by: FAMILY MEDICINE

## 2022-06-20 PROCEDURE — 80053 COMPREHEN METABOLIC PANEL: CPT | Performed by: FAMILY MEDICINE

## 2022-06-20 ASSESSMENT — ENCOUNTER SYMPTOMS
HEMATURIA: 0
PARESTHESIAS: 0
CONSTIPATION: 0
FREQUENCY: 0
ABDOMINAL PAIN: 0
COUGH: 0
DIARRHEA: 0
WEAKNESS: 0
DYSURIA: 0
HEARTBURN: 1
NAUSEA: 0
HEMATOCHEZIA: 0
HEADACHES: 0
FEVER: 0
ARTHRALGIAS: 1
PALPITATIONS: 0
SHORTNESS OF BREATH: 0
CHILLS: 0
DIZZINESS: 0
SORE THROAT: 0
JOINT SWELLING: 0
MYALGIAS: 0
EYE PAIN: 0
NERVOUS/ANXIOUS: 0

## 2022-06-20 ASSESSMENT — PAIN SCALES - GENERAL: PAINLEVEL: NO PAIN (0)

## 2022-06-20 ASSESSMENT — ACTIVITIES OF DAILY LIVING (ADL): CURRENT_FUNCTION: NO ASSISTANCE NEEDED

## 2022-06-20 NOTE — PROGRESS NOTES
"SUBJECTIVE:   Makenna Black is a 57 year old female who presents for Preventive Visit.      Patient has been advised of split billing requirements and indicates understanding: Yes  Are you in the first 12 months of your Medicare coverage?  No    Healthy Habits:     In general, how would you rate your overall health?  Good    Frequency of exercise:  None    Do you usually eat at least 4 servings of fruit and vegetables a day, include whole grains    & fiber and avoid regularly eating high fat or \"junk\" foods?  Yes    Taking medications regularly:  Yes    Medication side effects:  None    Ability to successfully perform activities of daily living:  No assistance needed    Home Safety:  No safety concerns identified    Hearing Impairment:  No hearing concerns    In the past 6 months, have you been bothered by leaking of urine?  No    In general, how would you rate your overall mental or emotional health?  Excellent      PHQ-2 Total Score: 0    Additional concerns today:  No    Do you feel safe in your environment? Yes    Have you ever done Advance Care Planning? (For example, a Health Directive, POLST, or a discussion with a medical provider or your loved ones about your wishes): No, advance care planning information given to patient to review.  Advanced care planning was discussed at today's visit.       Fall risk       Cognitive Screening   1) Repeat 3 items (Leader, Season, Table)    2) Clock draw: NORMAL  3) 3 item recall: Recalls 3 objects  Results: 3 items recalled: COGNITIVE IMPAIRMENT LESS LIKELY    Mini-CogTM Copyright S Campbell. Licensed by the author for use in [facility]; reprinted with permission ([email or web address]). All rights reserved.      Do you have sleep apnea, excessive snoring or daytime drowsiness?: no    Reviewed and updated as needed this visit by clinical staff   Tobacco  Allergies  Meds   Med Hx  Surg Hx  Fam Hx  Soc Hx          Reviewed and updated as needed this visit by " Provider                   Social History     Tobacco Use     Smoking status: Never Smoker     Smokeless tobacco: Never Used   Substance Use Topics     Alcohol use: Never       Alcohol Use 6/15/2022   Prescreen: >3 drinks/day or >7 drinks/week? Not Applicable     PROBLEMS TO ADD ON...  She also needs her meds refilled.  She recently got a bridge fill for a month.       Current providers sharing in care for this patient include:     Patient Care Team:  Ravi Gomez MD as PCP - General (Family Practice)  Ravi Gomez MD as Assigned PCP    The following health maintenance items are reviewed in Epic and correct as of today:  Health Maintenance Due   Topic Date Due     ANNUAL REVIEW OF HM ORDERS  Never done     HIV SCREENING  Never done     HEPATITIS C SCREENING  Never done     MAMMO SCREENING  06/01/2009     ZOSTER IMMUNIZATION (1 of 2) Never done     MEDICARE ANNUAL WELLNESS VISIT  02/13/2018     PAP  02/13/2020     LIPID  02/13/2022     Lab work is in process  Labs reviewed in EPIC      FHS-7:   Breast CA Risk Assessment (FHS-7) 6/15/2022   Did any of your first-degree relatives have breast or ovarian cancer? No   Did any of your relatives have bilateral breast cancer? No   Did any man in your family have breast cancer? No   Did any woman in your family have breast and ovarian cancer? Yes   Did any woman in your family have breast cancer before age 50 y? Unknown   Do you have 2 or more relatives with breast and/or ovarian cancer? No   Do you have 2 or more relatives with breast and/or bowel cancer? No        Mammogram Screening: Recommended mammography every 1-2 years with patient discussion and risk factor consideration  Pertinent mammograms are reviewed under the imaging tab.    Review of Systems   Constitutional: Negative for chills and fever.   HENT: Negative for congestion, ear pain, hearing loss and sore throat.    Eyes: Negative for pain and visual disturbance.   Respiratory: Negative for cough and  "shortness of breath.    Cardiovascular: Negative for chest pain, palpitations and peripheral edema.   Gastrointestinal: Positive for heartburn. Negative for abdominal pain, constipation, diarrhea, hematochezia and nausea.   Breasts:  Positive for tenderness.   Genitourinary: Negative for dysuria, frequency, genital sores, hematuria, pelvic pain, urgency, vaginal bleeding and vaginal discharge.   Musculoskeletal: Positive for arthralgias. Negative for joint swelling and myalgias.   Skin: Negative for rash.   Neurological: Negative for dizziness, weakness, headaches and paresthesias.   Psychiatric/Behavioral: Negative for mood changes. The patient is not nervous/anxious.          OBJECTIVE:   /86   Pulse 69   Temp 98.3  F (36.8  C)   Resp 20   Ht 1.594 m (5' 2.75\")   Wt 117 kg (258 lb)   SpO2 97%   Breastfeeding No   BMI 46.07 kg/m   Estimated body mass index is 46.07 kg/m  as calculated from the following:    Height as of this encounter: 1.594 m (5' 2.75\").    Weight as of this encounter: 117 kg (258 lb).     Physical Exam  GENERAL APPEARANCE: healthy, alert and no distress  EYES: Eyes grossly normal to inspection, PERRL and conjunctivae and sclerae normal  HENT: nose and mouth without ulcers or lesions, oropharynx clear and oral mucous membranes moist  NECK: no adenopathy, no asymmetry, masses, or scars and thyroid normal to palpation  RESP: lungs clear to auscultation - no rales, rhonchi or wheezes  CV: regular rate and rhythm, normal S1 S2, no S3 or S4, no murmur, click or rub, no peripheral edema and peripheral pulses strong  ABDOMEN: soft, nontender, no hepatosplenomegaly, no masses and bowel sounds normal  MS: no musculoskeletal defects are noted and gait is age appropriate without ataxia  SKIN: no suspicious lesions or rashes  NEURO: Normal strength and tone, sensory exam grossly normal, mentation intact and speech normal  PSYCH: mentation appears normal and affect normal/bright    Diagnostic " "Test Results:  Labs reviewed in Epic    ASSESSMENT / PLAN:   (Z00.00) Encounter for Medicare annual wellness exam  (primary encounter diagnosis)  Comment:   Plan: REVIEW OF HEALTH MAINTENANCE PROTOCOL ORDERS,         Comprehensive metabolic panel, Hemoglobin A1c            (E03.9) Hypothyroidism, unspecified type  Comment: On levothyroxine 75 mcg  Plan: TSH with free T4 reflex, T4 free        Abnormal thyroid labs  Continue on the thyroid medication and recheck level in 4-6 week to gauge therapy.    (E53.8) Vitamin B12 deficiency (non anemic)  Comment:   Plan: CBC with platelets, Vitamin B12        Low hemoglobin of 9.6 but stable in comparison  Other Lab values marked (H) or (L) are not clinically/medically significant   Normal B12    (Z98.84) History of gastric bypass  (K21.9) Gastroesophageal reflux disease without esophagitis  Comment: Refill omeprazole      (F32.89) Other depression  Comment: Stable on current treatment  Plan: Refill venlafaxine    (Z12.31) Visit for screening mammogram  Comment:   Plan: MA SCREENING DIGITAL BILAT - Future  (s+30)            (Z13.220) Screening for hyperlipidemia  Comment:   Plan: Lipid panel reflex to direct LDL Fasting        The 10-year ASCVD risk score (Gridley DYLAN Jr., et al., 2013) is: 2.6%  In view of diabetes, recommending statin (lipid lowering medication) to mitigate the risk      (Z12.11) Special screening for malignant neoplasms, colon  Comment:   Plan: Adult Gastro Ref - Procedure Only              Patient has been advised of split billing requirements and indicates understanding: Yes    COUNSELING:  Reviewed preventive health counseling, as reflected in patient instructions       Regular exercise       Healthy diet/nutrition    Estimated body mass index is 46.07 kg/m  as calculated from the following:    Height as of this encounter: 1.594 m (5' 2.75\").    Weight as of this encounter: 117 kg (258 lb).      She reports that she has never smoked. She has never used " smokeless tobacco.      Appropriate preventive services were discussed with this patient, including applicable screening as appropriate for cardiovascular disease, diabetes, osteopenia/osteoporosis, and glaucoma.  As appropriate for age/gender, discussed screening for colorectal cancer, prostate cancer, breast cancer, and cervical cancer. Checklist reviewing preventive services available has been given to the patient.    Reviewed patients plan of care and provided an AVS. The Basic Care Plan (routine screening as documented in Health Maintenance) for Makenna meets the Care Plan requirement. This Care Plan has been established and reviewed with the Patient.    Counseling Resources:  ATP IV Guidelines  Pooled Cohorts Equation Calculator  Breast Cancer Risk Calculator  Breast Cancer: Medication to Reduce Risk  FRAX Risk Assessment  ICSI Preventive Guidelines  Dietary Guidelines for Americans, 2010  USDA's MyPlate  ASA Prophylaxis  Lung CA Screening    Ravi Gomez MD  [facility]    Identified Health Risks:    She is at risk for lack of exercise and has been provided with information to increase physical activity for the benefit of her well-being.

## 2022-06-20 NOTE — LETTER
July 7, 2022      Glenys Black  [address] [place] [address]        Dear ,    We are writing to inform you of your test results.    The 10-year ASCVD risk score (Roma DYLAN Jr., et al., 2013) is: 2.6%   In view of diabetes, recommending statin (lipid lowering medication) to mitigate the risk     Please call us and let us know if you are willing to start taking a statin medication at [phone]., we will have it sent to the pharmacy we have listed in your chart.       Resulted Orders   Lipid panel reflex to direct LDL Fasting   Result Value Ref Range    Cholesterol 166 <=199 mg/dL    Triglycerides 132 <=149 mg/dL    Direct Measure HDL 55 >=50 mg/dL      Comment:      HDL Cholesterol Reference Range:     0-2 years:   No reference ranges established for patients under 2 years old  at [facility] for lipid analytes.    2-8 years:  Greater than 45 mg/dL     18 years and older:   Female: Greater than or equal to 50 mg/dL   Male:   Greater than or equal to 40 mg/dL    LDL Cholesterol Calculated 85 <=129 mg/dL    Patient Fasting > 8hrs? Unknown    Comprehensive metabolic panel   Result Value Ref Range    Sodium 139 136 - 145 mmol/L    Potassium 4.6 3.5 - 5.0 mmol/L    Chloride 109 (H) 98 - 107 mmol/L    Carbon Dioxide (CO2) 19 (L) 22 - 31 mmol/L    Anion Gap 11 5 - 18 mmol/L    Urea Nitrogen 17 8 - 22 mg/dL    Creatinine 1.01 0.60 - 1.10 mg/dL    Calcium 10.3 8.5 - 10.5 mg/dL    Glucose 92 70 - 125 mg/dL    Alkaline Phosphatase 88 45 - 120 U/L    AST 34 0 - 40 U/L    ALT 39 0 - 45 U/L    Protein Total 6.6 6.0 - 8.0 g/dL    Albumin 4.1 3.5 - 5.0 g/dL    Bilirubin Total 0.4 0.0 - 1.0 mg/dL    GFR Estimate 64 >60 mL/min/1.73m2      Comment:      Effective December 21, 2021 eGFRcr in adults is calculated using the 2021 CKD-EPI creatinine equation which includes age and gender (Rita chau al., NEJM, DOI: 10.1056/KXRAgb8001264)   CBC with platelets   Result Value Ref Range    WBC Count 5.8 4.0 - 11.0 10e3/uL     RBC Count 4.02 3.80 - 5.20 10e6/uL    Hemoglobin 9.6 (L) 11.7 - 15.7 g/dL    Hematocrit 32.2 (L) 35.0 - 47.0 %    MCV 80 78 - 100 fL    MCH 23.9 (L) 26.5 - 33.0 pg    MCHC 29.8 (L) 31.5 - 36.5 g/dL    RDW 17.0 (H) 10.0 - 15.0 %    Platelet Count 233 150 - 450 10e3/uL   TSH with free T4 reflex   Result Value Ref Range    TSH 71.95 (H) 0.30 - 5.00 uIU/mL   Vitamin B12   Result Value Ref Range    Vitamin B12 542 213 - 816 pg/mL   Hemoglobin A1c   Result Value Ref Range    Hemoglobin A1C 5.2 0.0 - 5.6 %      Comment:      Normal <5.7%   Prediabetes 5.7-6.4%    Diabetes 6.5% or higher     Note: Adopted from ADA consensus guidelines.   T4 free   Result Value Ref Range    Free T4 0.58 (L) 0.70 - 1.80 ng/dL      Comment:      Performance of the Free T4 test has not been established with  specimens (<= 2 months of age).         If you have any questions or concerns, please call the clinic at the number listed above.       Sincerely,      Ravi Gomez MD

## 2022-06-20 NOTE — PATIENT INSTRUCTIONS
Patient Education   Personalized Prevention Plan  You are due for the preventive services outlined below.  Your care team is available to assist you in scheduling these services.  If you have already completed any of these items, please share that information with your care team to update in your medical record.  Health Maintenance Due   Topic Date Due     ANNUAL REVIEW OF HM ORDERS  Never done     HIV Screening  Never done     Hepatitis C Screening  Never done     Mammogram  06/01/2009     Zoster (Shingles) Vaccine (1 of 2) Never done     PAP Smear  02/13/2020     Cholesterol Lab  02/13/2022       Exercise for a Healthier Heart  You may wonder how you can improve the health of your heart. If you re thinking about exercise, you re on the right track. You don t need to become an athlete. But you do need a certain amount of brisk exercise to help strengthen your heart. If you have been diagnosed with a heart condition, your healthcare provider may advise exercise to help stabilize your condition. To help make exercise a habit, choose safe, fun activities.      Exercise with a friend. When activity is fun, you're more likely to stick with it.   Before you start  Check with your healthcare provider before starting an exercise program. This is especially important if you have not been active for a while. It's also important if you have a long-term (chronic) health problem such as heart disease, diabetes, or obesity. Or if you are at high risk for having these problems.   Why exercise?  Exercising regularly offers many healthy rewards. It can help you do all of the following:     Improve your blood cholesterol level to help prevent further heart trouble    Lower your blood pressure to help prevent a stroke or heart attack    Control diabetes, or reduce your risk of getting this disease    Improve your heart and lung function    Reach and stay at a healthy weight    Make your muscles stronger so you can stay  active    Prevent falls and fractures by slowing the loss of bone mass (osteoporosis)    Manage stress better    Reduce your blood pressure    Improve your sense of self and your body image  Exercise tips      Ease into your routine. Set small goals. Then build on them. If you are not sure what your activity level should be, talk with your healthcare provider first before starting an exercise routine.    Exercise on most days. Aim for a total of 150 minutes (2 hours and 30 minutes) or more of moderate-intensity aerobic activity each week. Or 75 minutes (1 hour and 15 minutes) or more of vigorous-intensity aerobic activity each week. Or try for a combination of both. Moderate activity means that you breathe heavier and your heart rate increases but you can still talk. Think about doing 40 minutes of moderate exercise, 3 to 4 times a week. For best results, activity should last for about 40 minutes to lower blood pressure and cholesterol. It's OK to work up to the 40-minute period over time. Examples of moderate-intensity activity are walking 1 mile in 15 minutes. Or doing 30 to 45 minutes of yard work.    Step up your daily activity level.  Along with your exercise program, try being more active the whole day. Walk instead of drive. Or park further away so that you take more steps each day. Do more household tasks or yard work. You may not be able to meet the advised mount of physical activity. But doing some moderate- or vigorous-intensity aerobic activity can help reduce your risk for heart disease. Your healthcare provider can help you figure out what is best for you.    Choose 1 or more activities you enjoy.  Walking is one of the easiest things you can do. You can also try swimming, riding a bike, dancing, or taking an exercise class.    When to call your healthcare provider  Call your healthcare provider if you have any of these:     Chest pain or feel dizzy or lightheaded    Burning, tightness, pressure, or  heaviness in your chest, neck, shoulders, back, or arms    Abnormal shortness of breath    More joint or muscle pain    A very fast or irregular heartbeat (palpitations)  Robert last reviewed this educational content on 7/1/2019 2000-2021 [employer]. All rights reserved. This information is not intended as a substitute for professional medical care. Always follow your healthcare professional's instructions.

## 2022-06-20 NOTE — PROGRESS NOTES
".  Answers for HPI/ROS submitted by the patient on 6/15/2022  In general, how would you rate your overall physical health?: good  Frequency of exercise:: None  Do you usually eat at least 4 servings of fruit and vegetables a day, include whole grains & fiber, and avoid regularly eating high fat or \"junk\" foods? : Yes  Taking medications regularly:: Yes  Medication side effects:: None  Activities of Daily Living: no assistance needed  Home safety: no safety concerns identified  Hearing Impairment:: no hearing concerns  In the past 6 months, have you been bothered by leaking of urine?: No  abdominal pain: No  Blood in stool: No  Blood in urine: No  chest pain: No  chills: No  congestion: No  constipation: No  cough: No  diarrhea: No  dizziness: No  ear pain: No  eye pain: No  nervous/anxious: No  fever: No  frequency: No  genital sores: No  headaches: No  hearing loss: No  heartburn: Yes  arthralgias: Yes  joint swelling: No  peripheral edema: No  mood changes: No  myalgias: No  nausea: No  dysuria: No  palpitations: No  Skin sensation changes: No  sore throat: No  urgency: No  rash: No  shortness of breath: No  visual disturbance: No  weakness: No  pelvic pain: No  vaginal bleeding: No  vaginal discharge: No  tenderness: Yes  In general, how would you rate your overall mental or emotional health?: excellent  Additional concerns today:: No      "

## 2022-06-21 LAB — T4 FREE SERPL-MCNC: 0.58 NG/DL (ref 0.7–1.8)

## 2022-06-25 RX ORDER — VENLAFAXINE 75 MG/1
75 TABLET ORAL 2 TIMES DAILY
Qty: 180 TABLET | Refills: 3 | Status: SHIPPED | OUTPATIENT
Start: 2022-06-25 | End: 2023-06-20

## 2022-06-25 RX ORDER — LEVOTHYROXINE SODIUM 75 UG/1
75 TABLET ORAL DAILY
Qty: 60 TABLET | Refills: 0 | Status: SHIPPED | OUTPATIENT
Start: 2022-06-25 | End: 2022-07-06

## 2022-06-25 RX ORDER — CYANOCOBALAMIN 1000 UG/ML
1000 INJECTION, SOLUTION INTRAMUSCULAR; SUBCUTANEOUS
Qty: 3 ML | Refills: 3 | Status: SHIPPED | OUTPATIENT
Start: 2022-06-25

## 2022-07-01 ENCOUNTER — TELEPHONE (OUTPATIENT)
Dept: FAMILY MEDICINE | Facility: CLINIC | Age: 57
End: 2022-07-01

## 2022-07-01 DIAGNOSIS — E03.9 HYPOTHYROIDISM, UNSPECIFIED TYPE: ICD-10-CM

## 2022-07-01 NOTE — TELEPHONE ENCOUNTER
----- Message from Ravi Gomez MD sent at 6/25/2022 10:40 PM CDT -----  Please call patient:  The 10-year ASCVD risk score (Sheltonvineet RICHARDSON Jr., et al., 2013) is: 2.6%  In view of diabetes, recommending statin (lipid lowering medication) to mitigate the risk

## 2022-07-06 ENCOUNTER — TELEPHONE (OUTPATIENT)
Dept: GASTROENTEROLOGY | Facility: CLINIC | Age: 57
End: 2022-07-06

## 2022-07-06 RX ORDER — LEVOTHYROXINE SODIUM 88 UG/1
88 TABLET ORAL DAILY
Qty: 90 TABLET | Refills: 0 | Status: SHIPPED | OUTPATIENT
Start: 2022-07-06 | End: 2022-10-09

## 2022-07-06 NOTE — TELEPHONE ENCOUNTER
Reason for Call:  Other returning call    Detailed comments: Relayed message, Patient will research statin medications before she agrees.    Patient would like call back - does she have diabetes?  She has never been told she is diabetic.  Re-read PCP's message and shared with patient will ask care team to call and verify.    Patient is requesting for adjustment now of thyroid medication, she does not want to wait another 4 to 6 weeks.  Her result of 71.95 she feels is off the chart and wants to address now not later as she does not have a thyroid and depends on this medicine.    [facility] on [address] and [facility] is the preferred pharmacy.  Requesting call back and not a my chart message.     Phone Number Patient can be reached at: Home number on file [phone] (home)    Best Time: Any    Can we leave a detailed message on this number? NO    Call taken on 7/6/2022 at 8:53 AM by Rosario Sam

## 2022-07-06 NOTE — TELEPHONE ENCOUNTER
Please call pt.  Covering for Dr VILLARREAL.  As per her last labs, does not have diabetes as per her last a1c.  It was 5.2 and diabetes is considered >6.5.  Will call in a higher dose of the thyroid medication.

## 2022-07-06 NOTE — TELEPHONE ENCOUNTER
Screening Questions    BlueKIND OF PREP RedLOCATION [review exclusion criteria] GreenSEDATION TYPE      1. Are you active on mychart? y    2. What insurance is in the chart? Medicare     3.   Ordering/Referring Provider: Patricia    4. BMI   (If greater than 40 review exclusion criteria [PAC APPT IF [MAC] @ [facility])  42.9 [If yes, BMI OVER 40-EXTENDED PREP]      **(Sedation review/consideration needed)**  Do you have a legal guardian or Medical Power of    and/or are you able to give consent for your medical care?     y    5. Have you had a positive covid test in the last 90 days?   n - n    6.  Are you currently on dialysis?   n [ If yes, G-PREP & HOSPITAL setting ONLY]     7.  Do you have chronic kidney disease?  n [ If yes, G-PREP ]    8.   Do you have a diagnosis of diabetes?   n   [ If yes, G-PREP ]    9.  On a regular basis do you go 3-5 days between bowel movements?   n   [ If yes, EXTENDED PREP]    10.  Are you taking any prescription pain medications on a routine schedule?    n - n [ If yes, EXTENDED PREP] [If yes, MAC]      11.   Do you have any chemical dependencies such as alcohol, street drugs, or methadone?    n [If yes, MAC]    12.   Do you have any history of post-traumatic stress syndrome, severe anxiety or history of psychosis?    n  [If yes, MAC]    13.  [FEMALES] Are you currently pregnant?     If yes, how many weeks?       Respiratory/Heart Screening:  [If yes to any of the following HOSPITAL setting only]     14. Do you have Pulmonary Hypertension [Lungs]?   n       15. Do you have UNCONTROLLED asthma?   n     16.  Do you use daily home oxygen?  n      17. Do you have mod to severe Obstructive Sleep Apnea?         (OKAY @ [facility]  [facility]  SH  PH  [facility]  [facility] - if pt is not on OXYGEN)  n      18.   Have you had a heart or lung transplant?   n      19.   Have you had a stroke or Transient ischemic attack (TIA - aka  mini stroke ) within 6 months?  (If yes, please review exclusion criteria)  n      20.   In the past 6 months, have you had any heart related issues including cardiomyopathy or heart attack?   n           If yes, did it require cardiac stenting or other implantable device?   n      21.   Do you have any implantable devices in your body (pacemaker, defib, LVAD)? (If yes, please review exclusion criteria)  n     22. Do you take nitroglycerin?   n           If yes, how often? n  (if yes, HOSPITAL setting ONLY)    23.  Are you currently taking any blood thinners?    [If yes, INFORM patient to [facility] w/ ORDERING PROVIDER FOR BRIDGING INSTRUCTIONS]     n    24.   Do you transfer independently?                (If NO, please HOSPITAL setting ONLY)  y    25.   Preferred LOCAL Pharmacy for Pre Prescription:         [facility] [address]  [id number] - [place], [place] - [address] AT [facility] & [facility]    Scheduling Details  (Please ask for phone number if not scheduled by patient)      Caller : Makenna Black  Date of Procedure: 8/9  Surgeon: Poonam  Location: [facility]        Sedation Type: CS l   Conscious Sedation- Needs  for 6 hours after the procedure  MAC/General-Needs  for 24 hours after procedure    n :[Pre-op Required] at [facility]  SH  MG and OR for MAC sedation   (advise patient they will need a pre-op WITH IN 30 DAYS of procedure date)     Type of Procedure Scheduled:   Lower Endoscopy [Colonoscopy]    Which Colonoscopy Prep was Sent?:   Allieep    [facility] PATIENTS & [facility] PATIENTS NEEDS EXTENDED PREP       Informed patient they will need an adult  y  Cannot take any type of public or medical transportation alone    Pre-Procedure Covid test to [facility] or Externally: y  **INFORMED OF HOME TESTING & LAB OPTION**        Confirmed Nurse will call to complete assessment y    Additional comments:

## 2022-07-06 NOTE — TELEPHONE ENCOUNTER
Contact patient- updated her that she does not have diabetes & that new dose of levothyroxine sent to pharmacy  Scheduled lab only for a TSH recheck in Oct

## 2022-07-07 ENCOUNTER — TELEPHONE (OUTPATIENT)
Dept: FAMILY MEDICINE | Facility: CLINIC | Age: 57
End: 2022-07-07

## 2022-07-07 NOTE — TELEPHONE ENCOUNTER
Writer called and spoke with patient, she stated that she had received two other calls from [place] regarding this and there is no need to start a statin medication at this time.

## 2022-07-07 NOTE — TELEPHONE ENCOUNTER
----- Message from Ravi Gomez MD sent at 6/25/2022 10:40 PM CDT -----  Please call patient:  The 10-year ASCVD risk score (Conestogavineet RICHARDSON Jr., et al., 2013) is: 2.6%  In view of diabetes, recommending statin (lipid lowering medication) to mitigate the risk

## 2022-07-11 ENCOUNTER — ANCILLARY PROCEDURE (OUTPATIENT)
Dept: MAMMOGRAPHY | Facility: HOSPITAL | Age: 57
End: 2022-07-11
Attending: FAMILY MEDICINE
Payer: MEDICARE

## 2022-07-11 DIAGNOSIS — Z12.31 VISIT FOR SCREENING MAMMOGRAM: ICD-10-CM

## 2022-07-11 PROCEDURE — 77067 SCR MAMMO BI INCL CAD: CPT

## 2022-08-01 RX ORDER — BISACODYL 5 MG
1 TABLET, DELAYED RELEASE (ENTERIC COATED) ORAL SEE ADMIN INSTRUCTIONS
Qty: 4 TABLET | Refills: 0 | Status: SHIPPED | OUTPATIENT
Start: 2022-08-01

## 2022-08-09 ENCOUNTER — HOSPITAL ENCOUNTER (OUTPATIENT)
Facility: AMBULATORY SURGERY CENTER | Age: 57
Discharge: HOME | End: 2022-08-09
Attending: SURGERY | Admitting: SURGERY
Payer: MEDICARE

## 2022-08-09 VITALS
RESPIRATION RATE: 16 BRPM | OXYGEN SATURATION: 96 % | HEART RATE: 65 BPM | SYSTOLIC BLOOD PRESSURE: 124 MMHG | DIASTOLIC BLOOD PRESSURE: 60 MMHG | TEMPERATURE: 96.1 F

## 2022-08-09 DIAGNOSIS — Z12.11 SCREENING FOR COLON CANCER: Primary | ICD-10-CM

## 2022-08-09 LAB — COLONOSCOPY: NORMAL

## 2022-08-09 PROCEDURE — G8918 PT W/O PREOP ORDER IV AB PRO: HCPCS

## 2022-08-09 PROCEDURE — G0121 COLON CA SCRN NOT HI RSK IND: HCPCS | Performed by: SURGERY

## 2022-08-09 PROCEDURE — G0121 COLON CA SCRN NOT HI RSK IND: HCPCS

## 2022-08-09 PROCEDURE — G0500 MOD SEDAT ENDO SERVICE >5YRS: HCPCS | Mod: PT | Performed by: SURGERY

## 2022-08-09 PROCEDURE — G8907 PT DOC NO EVENTS ON DISCHARG: HCPCS

## 2022-08-09 RX ORDER — FENTANYL CITRATE 50 UG/ML
INJECTION, SOLUTION INTRAMUSCULAR; INTRAVENOUS PRN
Status: DISCONTINUED | OUTPATIENT
Start: 2022-08-09 | End: 2022-08-09 | Stop reason: HOSPADM

## 2022-08-09 RX ORDER — NALOXONE HYDROCHLORIDE 0.4 MG/ML
0.2 INJECTION, SOLUTION INTRAMUSCULAR; INTRAVENOUS; SUBCUTANEOUS
Status: DISCONTINUED | OUTPATIENT
Start: 2022-08-09 | End: 2022-08-10 | Stop reason: HOSPADM

## 2022-08-09 RX ORDER — NALOXONE HYDROCHLORIDE 0.4 MG/ML
0.4 INJECTION, SOLUTION INTRAMUSCULAR; INTRAVENOUS; SUBCUTANEOUS
Status: DISCONTINUED | OUTPATIENT
Start: 2022-08-09 | End: 2022-08-10 | Stop reason: HOSPADM

## 2022-08-09 RX ORDER — ONDANSETRON 2 MG/ML
4 INJECTION INTRAMUSCULAR; INTRAVENOUS
Status: DISCONTINUED | OUTPATIENT
Start: 2022-08-09 | End: 2022-08-10 | Stop reason: HOSPADM

## 2022-08-09 RX ORDER — LIDOCAINE 40 MG/G
CREAM TOPICAL
Status: DISCONTINUED | OUTPATIENT
Start: 2022-08-09 | End: 2022-08-10 | Stop reason: HOSPADM

## 2022-08-09 RX ORDER — ONDANSETRON 2 MG/ML
4 INJECTION INTRAMUSCULAR; INTRAVENOUS EVERY 6 HOURS PRN
Status: DISCONTINUED | OUTPATIENT
Start: 2022-08-09 | End: 2022-08-10 | Stop reason: HOSPADM

## 2022-08-09 RX ORDER — PROCHLORPERAZINE MALEATE 10 MG
10 TABLET ORAL EVERY 6 HOURS PRN
Status: DISCONTINUED | OUTPATIENT
Start: 2022-08-09 | End: 2022-08-10 | Stop reason: HOSPADM

## 2022-08-09 RX ORDER — ONDANSETRON 4 MG/1
4 TABLET, ORALLY DISINTEGRATING ORAL EVERY 6 HOURS PRN
Status: DISCONTINUED | OUTPATIENT
Start: 2022-08-09 | End: 2022-08-10 | Stop reason: HOSPADM

## 2022-08-09 RX ORDER — FLUMAZENIL 0.1 MG/ML
0.2 INJECTION, SOLUTION INTRAVENOUS
Status: ACTIVE | OUTPATIENT
Start: 2022-08-09 | End: 2022-08-09

## 2022-08-09 NOTE — H&P
Patient seen for Endoscopy    HPI:  Patient is a 57 year old female here for endoscopy. Not taking blood thinning medications. No MI or CVA history. No issues with previous sedation. No recent acute illness.    Review Of Systems    Skin: negative  Ears/Nose/Throat: negative  Respiratory: No shortness of breath, dyspnea on exertion, cough, or hemoptysis  Cardiovascular: negative  Gastrointestinal: negative  Genitourinary: negative  Musculoskeletal: negative  Neurologic: negative  Hematologic/Lymphatic/Immunologic: negative  Endocrine: negative      Past Medical History:   Diagnosis Date     Borderline personality disorder (H)      Chest pain     normal coronary arteries in 2011 on angiography     Depression      GERD (gastroesophageal reflux disease)      Iron deficiency      Migraine headache      Obesity      Osteoarthritis      Polysubstance abuse (H)        Past Surgical History:   Procedure Laterality Date     APPENDECTOMY       ARTHROSCOPY KNEE       CHOLECYSTECTOMY       GASTRIC BYPASS  01/01/2007    Mara-n-Y     GASTROPLASTY VERTICAL BANDED  01/01/2003     HYSTERECTOMY       SALPINGOOPHORECTOMY       SINUS SURGERY       SPINE SURGERY      cervical     TONSILLECTOMY       TOTAL KNEE ARTHROPLASTY Bilateral 2006/2007       Family History   Problem Relation Age of Onset     Hypertension Mother         A&W     GERD Mother      Diabetes Father         A&W       Social History     Socioeconomic History     Marital status: Single     Spouse name: Not on file     Number of children: Not on file     Years of education: Not on file     Highest education level: Not on file   Occupational History     Not on file   Tobacco Use     Smoking status: Never Smoker     Smokeless tobacco: Never Used   Vaping Use     Vaping Use: Never used   Substance and Sexual Activity     Alcohol use: Never     Drug use: Never     Sexual activity: Not Currently   Other Topics Concern     Not on file   Social History Narrative    Single, no  "children 12/15     Social Determinants of Health     Financial Resource Strain: Not on file   Food Insecurity: Not on file   Transportation Needs: Not on file   Physical Activity: Not on file   Stress: Not on file   Social Connections: Not on file   Intimate Partner Violence: Not on file   Housing Stability: Not on file       Current Outpatient Medications   Medication Sig Dispense Refill     bisacodyl (DULCOLAX) 5 MG EC tablet Take 1 tablet (5 mg) by mouth See Admin Instructions --2 days prior to procedure, take two (2) tablets at 4 pm.  1 day prior to procedure, take two (2) tablets at 4 pm.  For additional instructions refer to you colonoscopy prep instructions. 4 tablet 0     cyanocobalamin (CYANOCOBALAMIN) 1000 MCG/ML injection Inject 1 mL (1,000 mcg) into the muscle every 30 days 3 mL 3     levothyroxine (SYNTHROID) 88 MCG tablet Take 1 tablet (88 mcg) by mouth daily 90 tablet 0     omeprazole (PRILOSEC) 20 MG DR capsule Take 1 capsule (20 mg) by mouth 2 times daily for 360 days 180 capsule 3     polyethylene glycol (GOLYTELY) 236 g suspension Take 6,000 mLs by mouth See Admin Instructions --For instructions refer to you colonoscopy prep instructions. 8000 mL 0     venlafaxine (EFFEXOR) 75 MG tablet Take 1 tablet (75 mg) by mouth 2 times daily for 360 days 180 tablet 3     syringe/needle, disp, (BD INTEGRA SYRINGE) 25G X 1\" 3 ML MISC USE AS DIRECTED 50 each 0       Medications and history reviewed    Physical exam:  Vitals: /65   Pulse 65   Temp (!) 96.1  F (35.6  C)   Resp 16   SpO2 95%   BMI= There is no height or weight on file to calculate BMI.    Constitutional: Healthy, alert, non-distressed   Head: Normo-cephalic, atraumatic, no lesions, masses or tenderness   Cardiovascular: RRR, no new murmurs, +S1, +S2 heart sounds, no clicks, rubs or gallops   Respiratory: CTAB, no rales, rhonchi or wheezing, equal chest rise, good respiratory effort   Gastrointestinal: Soft, non-tender, non distended, no " rebound rigidity or guarding, no masses or hernias palpated   : Deferred  Musculoskeletal: Moves all extremities, normal  strength, no deformities noted   Skin: No suspicious lesions or rashes   Psychiatric: Mentation appears normal, affect appropriate   Hematologic/Lymphatic/Immunologic: Normal cervical and supraclavicular lymph nodes   Patient able to get up on table without difficulty.    Labs show:  Results for orders placed or performed during the hospital encounter of 08/09/22 (from the past 24 hour(s))   COLONOSCOPY   Result Value Ref Range    COLONOSCOPY       [facility]  Endoscopy Department-[place]  _______________________________________________________________________________  Patient Name: Makenna Black             Procedure Date: 8/9/2022 7:50 AM  MRN: [id number]                       YOB: 1965  Admit Type: Outpatient                Age: 57  Gender: Female                        Note Status: Finalized  Attending MD: BEENA BUCK MD  Instrument Name: [id number]  _______________________________________________________________________________     Procedure:                Colonoscopy  Indications:              Screening for colorectal malignant neoplasm  Providers:                BEENA BUCK MD  Referring MD:             AMEYA ESQUIVEL MD  Medicines:                Midazolam 4 mg IV, Fentanyl 200 micrograms IV  Complications:            No immediate complications.  _______________________________________________________________________________  Procedure :                Pre-Anesthesia Assessment:                            - Prior to the procedure, a History and Physical                             was performed, and patient medications, allergies                             and sensitivities were reviewed. The patient's                             tolerance of previous anesthesia was reviewed.                             - The risks and benefits of the procedure and the                             sedation options and risks were discussed with the                             patient. All questions were answered and informed                             consent was obtained.                            - After reviewing the risks and benefits, the                             patient was deemed in satisfactory condition to                             undergo the procedure.                            After obtaining informed consent, the colonoscope                             was passed under direct vision. Throughout the                              procedure, the patient's blood pressure, pulse, and                             oxygen saturations were monitored continuously. The                             was introduced through the anus and advanced to the                             cecum, identified by appendiceal orifice and                             ileocecal valve. The colonoscopy was performed                             without difficulty. The patient tolerated the                             procedure well. The quality of the bowel                             preparation was good.                                                                                   Findings:       Hemorrhoids were found on perianal exam.       The colon (entire examined portion) appeared normal.                                                                                   Moderate Sedation:       Moderate (conscious) sedation was administered by the endoscopy nurse        and supervised by the endoscopist. The patient's oxyg en saturation,        heart rate, blood pressure and response to care were monitored. Total        physician intraservice time was 23 minutes.       An independent trained observer was present and continuously monitored        the patient.  Impression:               - Hemorrhoids found on perianal exam.                             - The entire examined colon is normal.                            - No specimens collected.  Recommendation:           - Discharge patient to home.                            - High fiber diet and low fat diet.                            - Continue present medications.                            - Repeat colonoscopy in 10 years for screening                             purposes.                                                                                     __________________________  RAGHAV LEVIN MD  8/9/2022 8:39:38 AM  Number of Addenda: 0    Note Initiated On: 8/9/2022 7:50 AM  Scope In:  Scope Out:         Assessment: Endoscopy  Plan: Pt cleared for anesthesia for proposed procedure.    Raghav Levin DO

## 2022-08-11 PROBLEM — Z98.890 HX OF COLONOSCOPY: Status: ACTIVE | Noted: 2022-08-11

## 2022-10-03 ENCOUNTER — LAB (OUTPATIENT)
Dept: LAB | Facility: CLINIC | Age: 57
End: 2022-10-03
Payer: MEDICARE

## 2022-10-03 DIAGNOSIS — Z11.59 NEED FOR HEPATITIS C SCREENING TEST: ICD-10-CM

## 2022-10-03 DIAGNOSIS — E03.9 HYPOTHYROIDISM, UNSPECIFIED TYPE: ICD-10-CM

## 2022-10-03 DIAGNOSIS — Z11.4 SCREENING FOR HIV (HUMAN IMMUNODEFICIENCY VIRUS): ICD-10-CM

## 2022-10-03 LAB
HCV AB SERPL QL IA: NONREACTIVE
HIV 1+2 AB+HIV1 P24 AG SERPL QL IA: NONREACTIVE
T4 FREE SERPL-MCNC: 0.56 NG/DL (ref 0.9–1.7)
TSH SERPL DL<=0.005 MIU/L-ACNC: 83.4 UIU/ML (ref 0.3–4.2)

## 2022-10-03 PROCEDURE — 86803 HEPATITIS C AB TEST: CPT

## 2022-10-03 PROCEDURE — 87389 HIV-1 AG W/HIV-1&-2 AB AG IA: CPT

## 2022-10-03 PROCEDURE — 36415 COLL VENOUS BLD VENIPUNCTURE: CPT

## 2022-10-03 PROCEDURE — 84439 ASSAY OF FREE THYROXINE: CPT

## 2022-10-03 PROCEDURE — 84443 ASSAY THYROID STIM HORMONE: CPT

## 2022-10-09 DIAGNOSIS — E03.9 HYPOTHYROIDISM, UNSPECIFIED TYPE: ICD-10-CM

## 2022-10-09 RX ORDER — LEVOTHYROXINE SODIUM 125 UG/1
125 TABLET ORAL DAILY
Qty: 90 TABLET | Refills: 0 | Status: SHIPPED | OUTPATIENT
Start: 2022-10-09

## 2022-10-15 ENCOUNTER — HEALTH MAINTENANCE LETTER (OUTPATIENT)
Age: 57
End: 2022-10-15

## 2024-05-26 ENCOUNTER — HEALTH MAINTENANCE LETTER (OUTPATIENT)
Age: 59
End: 2024-05-26

## 2025-09-01 ENCOUNTER — PATIENT OUTREACH (OUTPATIENT)
Dept: CARE COORDINATION | Facility: CLINIC | Age: 60
End: 2025-09-01
Payer: MEDICARE

## (undated) DEVICE — KIT ENDO FIRST STEP DISINFECTANT 200ML W/POUCH EP-4

## (undated) DEVICE — PAD CHUX UNDERPAD 23X24" 7136

## (undated) RX ORDER — FENTANYL CITRATE 50 UG/ML
INJECTION, SOLUTION INTRAMUSCULAR; INTRAVENOUS
Status: DISPENSED
Start: 2022-08-09